# Patient Record
Sex: FEMALE | Race: WHITE | NOT HISPANIC OR LATINO | URBAN - METROPOLITAN AREA
[De-identification: names, ages, dates, MRNs, and addresses within clinical notes are randomized per-mention and may not be internally consistent; named-entity substitution may affect disease eponyms.]

---

## 2018-01-02 ENCOUNTER — INPATIENT (INPATIENT)
Facility: HOSPITAL | Age: 83
LOS: 6 days | Discharge: NURSING, REHAB OR LONG-TERM CARE | DRG: 871 | End: 2018-01-09
Attending: STUDENT IN AN ORGANIZED HEALTH CARE EDUCATION/TRAINING PROGRAM | Admitting: STUDENT IN AN ORGANIZED HEALTH CARE EDUCATION/TRAINING PROGRAM
Payer: MEDICARE

## 2018-01-02 VITALS
WEIGHT: 100.09 LBS | HEART RATE: 99 BPM | SYSTOLIC BLOOD PRESSURE: 92 MMHG | OXYGEN SATURATION: 84 % | DIASTOLIC BLOOD PRESSURE: 57 MMHG | TEMPERATURE: 98 F | RESPIRATION RATE: 22 BRPM

## 2018-01-02 DIAGNOSIS — E78.5 HYPERLIPIDEMIA, UNSPECIFIED: ICD-10-CM
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
## 2018-01-02 DIAGNOSIS — R41.82 ALTERED MENTAL STATUS, UNSPECIFIED: ICD-10-CM

## 2018-01-02 DIAGNOSIS — I10 ESSENTIAL (PRIMARY) HYPERTENSION: ICD-10-CM

## 2018-01-02 DIAGNOSIS — R74.8 ABNORMAL LEVELS OF OTHER SERUM ENZYMES: ICD-10-CM

## 2018-01-02 DIAGNOSIS — R06.02 SHORTNESS OF BREATH: ICD-10-CM

## 2018-01-02 DIAGNOSIS — R07.89 OTHER CHEST PAIN: ICD-10-CM

## 2018-01-02 DIAGNOSIS — I48.91 UNSPECIFIED ATRIAL FIBRILLATION: ICD-10-CM

## 2018-01-02 DIAGNOSIS — Z86.011 PERSONAL HISTORY OF BENIGN NEOPLASM OF THE BRAIN: Chronic | ICD-10-CM

## 2018-01-02 DIAGNOSIS — N17.9 ACUTE KIDNEY FAILURE, UNSPECIFIED: ICD-10-CM

## 2018-01-02 DIAGNOSIS — J10.1 INFLUENZA DUE TO OTHER IDENTIFIED INFLUENZA VIRUS WITH OTHER RESPIRATORY MANIFESTATIONS: ICD-10-CM

## 2018-01-02 DIAGNOSIS — A41.9 SEPSIS, UNSPECIFIED ORGANISM: ICD-10-CM

## 2018-01-02 LAB
APTT BLD: 34.1 SEC — SIGNIFICANT CHANGE UP (ref 27.5–37.4)
BASOPHILS # BLD AUTO: 0 K/UL — SIGNIFICANT CHANGE UP (ref 0–0.2)
BASOPHILS NFR BLD AUTO: 0 % — SIGNIFICANT CHANGE UP (ref 0–2)
CK MB CFR SERPL CALC: 3.4 NG/ML — SIGNIFICANT CHANGE UP (ref 0–3.8)
CK MB CFR SERPL CALC: 4.7 NG/ML — HIGH (ref 0–3.8)
CK SERPL-CCNC: 56 U/L — SIGNIFICANT CHANGE UP (ref 25–170)
CK SERPL-CCNC: 62 U/L — SIGNIFICANT CHANGE UP (ref 25–170)
EOSINOPHIL # BLD AUTO: 0 K/UL — SIGNIFICANT CHANGE UP (ref 0–0.5)
EOSINOPHIL NFR BLD AUTO: 0.2 % — SIGNIFICANT CHANGE UP (ref 0–6)
FLUAV H1 2009 PAND RNA SPEC QL NAA+PROBE: DETECTED
GAS PNL BLDV: SIGNIFICANT CHANGE UP
HCT VFR BLD CALC: 43.6 % — SIGNIFICANT CHANGE UP (ref 34.5–45)
HGB BLD-MCNC: 14.1 G/DL — SIGNIFICANT CHANGE UP (ref 11.5–15.5)
INR BLD: 0.94 RATIO — SIGNIFICANT CHANGE UP (ref 0.88–1.16)
LYMPHOCYTES # BLD AUTO: 1.6 K/UL — SIGNIFICANT CHANGE UP (ref 1–3.3)
LYMPHOCYTES # BLD AUTO: 15.3 % — SIGNIFICANT CHANGE UP (ref 13–44)
MCHC RBC-ENTMCNC: 32.4 GM/DL — SIGNIFICANT CHANGE UP (ref 32–36)
MCHC RBC-ENTMCNC: 32.5 PG — SIGNIFICANT CHANGE UP (ref 27–34)
MCV RBC AUTO: 100 FL — SIGNIFICANT CHANGE UP (ref 80–100)
MONOCYTES # BLD AUTO: 1.1 K/UL — HIGH (ref 0–0.9)
MONOCYTES NFR BLD AUTO: 10 % — SIGNIFICANT CHANGE UP (ref 2–14)
NEUTROPHILS # BLD AUTO: 7.9 K/UL — HIGH (ref 1.8–7.4)
NEUTROPHILS NFR BLD AUTO: 74.4 % — SIGNIFICANT CHANGE UP (ref 43–77)
PLATELET # BLD AUTO: 161 K/UL — SIGNIFICANT CHANGE UP (ref 150–400)
PROTHROM AB SERPL-ACNC: 10.2 SEC — SIGNIFICANT CHANGE UP (ref 9.8–12.7)
RAPID RVP RESULT: DETECTED
RBC # BLD: 4.35 M/UL — SIGNIFICANT CHANGE UP (ref 3.8–5.2)
RBC # FLD: 12.8 % — SIGNIFICANT CHANGE UP (ref 10.3–14.5)
TROPONIN T SERPL-MCNC: 0.46 NG/ML — HIGH (ref 0–0.06)
TROPONIN T SERPL-MCNC: 0.48 NG/ML — HIGH (ref 0–0.06)
WBC # BLD: 10.6 K/UL — HIGH (ref 3.8–10.5)
WBC # FLD AUTO: 10.6 K/UL — HIGH (ref 3.8–10.5)

## 2018-01-02 PROCEDURE — 99285 EMERGENCY DEPT VISIT HI MDM: CPT | Mod: 25,GC

## 2018-01-02 PROCEDURE — 99223 1ST HOSP IP/OBS HIGH 75: CPT

## 2018-01-02 PROCEDURE — 93010 ELECTROCARDIOGRAM REPORT: CPT

## 2018-01-02 PROCEDURE — 70450 CT HEAD/BRAIN W/O DYE: CPT | Mod: 26

## 2018-01-02 PROCEDURE — 73522 X-RAY EXAM HIPS BI 3-4 VIEWS: CPT | Mod: 26

## 2018-01-02 PROCEDURE — 73620 X-RAY EXAM OF FOOT: CPT | Mod: 26,LT

## 2018-01-02 PROCEDURE — 72125 CT NECK SPINE W/O DYE: CPT | Mod: 26

## 2018-01-02 PROCEDURE — 71045 X-RAY EXAM CHEST 1 VIEW: CPT | Mod: 26

## 2018-01-02 RX ORDER — ASPIRIN/CALCIUM CARB/MAGNESIUM 324 MG
324 TABLET ORAL ONCE
Qty: 0 | Refills: 0 | Status: COMPLETED | OUTPATIENT
Start: 2018-01-02 | End: 2018-01-02

## 2018-01-02 RX ORDER — SODIUM CHLORIDE 9 MG/ML
1000 INJECTION INTRAMUSCULAR; INTRAVENOUS; SUBCUTANEOUS ONCE
Qty: 0 | Refills: 0 | Status: COMPLETED | OUTPATIENT
Start: 2018-01-02 | End: 2018-01-02

## 2018-01-02 RX ORDER — ATORVASTATIN CALCIUM 80 MG/1
40 TABLET, FILM COATED ORAL AT BEDTIME
Qty: 0 | Refills: 0 | Status: DISCONTINUED | OUTPATIENT
Start: 2018-01-02 | End: 2018-01-09

## 2018-01-02 RX ORDER — IPRATROPIUM/ALBUTEROL SULFATE 18-103MCG
3 AEROSOL WITH ADAPTER (GRAM) INHALATION EVERY 6 HOURS
Qty: 0 | Refills: 0 | Status: DISCONTINUED | OUTPATIENT
Start: 2018-01-02 | End: 2018-01-09

## 2018-01-02 RX ORDER — PHENYLEPHRINE HYDROCHLORIDE 10 MG/ML
0.1 INJECTION INTRAVENOUS
Qty: 80 | Refills: 0 | Status: DISCONTINUED | OUTPATIENT
Start: 2018-01-02 | End: 2018-01-02

## 2018-01-02 RX ORDER — HEPARIN SODIUM 5000 [USP'U]/ML
5000 INJECTION INTRAVENOUS; SUBCUTANEOUS EVERY 8 HOURS
Qty: 0 | Refills: 0 | Status: DISCONTINUED | OUTPATIENT
Start: 2018-01-02 | End: 2018-01-04

## 2018-01-02 RX ORDER — PREGABALIN 225 MG/1
1000 CAPSULE ORAL DAILY
Qty: 0 | Refills: 0 | Status: DISCONTINUED | OUTPATIENT
Start: 2018-01-02 | End: 2018-01-09

## 2018-01-02 RX ORDER — ACETAMINOPHEN 500 MG
650 TABLET ORAL EVERY 6 HOURS
Qty: 0 | Refills: 0 | Status: DISCONTINUED | OUTPATIENT
Start: 2018-01-02 | End: 2018-01-09

## 2018-01-02 RX ORDER — LIDOCAINE 4 G/100G
1 CREAM TOPICAL DAILY
Qty: 0 | Refills: 0 | Status: DISCONTINUED | OUTPATIENT
Start: 2018-01-02 | End: 2018-01-09

## 2018-01-02 RX ORDER — ASCORBIC ACID 60 MG
500 TABLET,CHEWABLE ORAL DAILY
Qty: 0 | Refills: 0 | Status: DISCONTINUED | OUTPATIENT
Start: 2018-01-02 | End: 2018-01-09

## 2018-01-02 RX ADMIN — ATORVASTATIN CALCIUM 40 MILLIGRAM(S): 80 TABLET, FILM COATED ORAL at 21:57

## 2018-01-02 RX ADMIN — HEPARIN SODIUM 5000 UNIT(S): 5000 INJECTION INTRAVENOUS; SUBCUTANEOUS at 21:56

## 2018-01-02 RX ADMIN — SODIUM CHLORIDE 2000 MILLILITER(S): 9 INJECTION INTRAMUSCULAR; INTRAVENOUS; SUBCUTANEOUS at 11:46

## 2018-01-02 RX ADMIN — Medication 324 MILLIGRAM(S): at 14:59

## 2018-01-02 RX ADMIN — Medication 3 MILLILITER(S): at 21:56

## 2018-01-02 RX ADMIN — SODIUM CHLORIDE 500 MILLILITER(S): 9 INJECTION INTRAMUSCULAR; INTRAVENOUS; SUBCUTANEOUS at 17:20

## 2018-01-02 NOTE — H&P ADULT - PROBLEM SELECTOR PLAN 6
Likely medication induced hallucinations given that pt was incorrectly taking Robitusson DM at home. Hallucniations now resolevd and Daughter reports pt back to normal mental status. Likely medication induced hallucinations given that pt was incorrectly taking Robitussion DM at home. Hallucinations now resolved and Daughter reports pt back to normal mental status.

## 2018-01-02 NOTE — ED PROVIDER NOTE - OBJECTIVE STATEMENT
89yr old female PMH HTN, HLD, vertigo, left hip fracture s/p IM nailing (1997) presents to ED after fall.  Has had cold, been taking robitussin nighttime diabetes mellitus, been with visual hallucinations since friday, fell sunday and hurt ribs, today fell onto bottom.  Has had decreased appetite.  Has not been taking blood pressure meds until yesterday.  Diffusely weaker than usual.  Today fell onto bottom.  Sunday hit head.  No LOC per daughter, was able to crawl to chair.  C/o R posterior rib pain.  Has had productive cough that is now gone.      primary medical doctor: Gill Jenkins. 89yr old female PMH HTN, HLD, bppv, left hip fracture s/p IM nailing (1997) presents to ED after fall.  Since Friday has had productive cough, congestion, malaise, generalized weakness and decreased appetite and has been taking Robitussin nighttimeDM, and having visual hallucinations per daughter.  Fell sun day and hurt R posteroinferior ribs.  Denies head trauma.  This morning, fell again while putting dishes away in kitchen and landed on bottom.  Denies loc or head trauma and able to crawl to chair and sit.  Says both times lost balance while turning quickly, denies chest pain, n/v, diaphoresis, palpitations.  Has long term shortness of breath on exertion unchanged from previous.   Has not been taking blood pressure meds while sick, started again yesterday.  Denies fever, chills, n/v, focal weakness, abdominal pain, pelvic pain, headache, neck pain, vision change, h/o vte, cardiac history.      primary medical doctor: Gill Jenkins.

## 2018-01-02 NOTE — ED PROVIDER NOTE - MUSCULOSKELETAL, MLM
Spine appears normal, range of motion is not limited.  TTP R posteroinferior rib.  Tenderness, bruising and swelling of L 1st toe.

## 2018-01-02 NOTE — ED ADULT TRIAGE NOTE - CHIEF COMPLAINT QUOTE
Patient fell this morning, unwitnessed at home. Two falls over the weekend.   Recently sick, overdosed on Robitussin per daughter. Pt is hallucinating and unsure of time/date. at baseline pt is A&Ox4.   +dehydration, AMS

## 2018-01-02 NOTE — H&P ADULT - PROBLEM SELECTOR PLAN 4
Likely pre-renal vs intrinsic renal damage given viral induced sepsis. s/p 1LNS in ed. Will give additional Liter  - Check UA  - Trend BMP Likely pre-renal dehydration given hemoconcentration vs intrinsic renal damage and ATN given viral induced sepsis. s/p 1LNS in ed. Will give additional Liter  - Check UA  - Trend BMP, monitor UOP Cr. 1.3, baseline 0.6-0.7. Likely pre-renal dehydration given hemoconcentration vs intrinsic renal damage and ATN given viral induced sepsis. s/p 1LNS in ed. Will give additional Liter  - Check UA  - Trend BMP, monitor UOP Cr. 1.3, baseline 0.6-0.7. Likely pre-renal dehydration given hemoconcentration vs intrinsic renal damage and ATN given viral induced sepsis. s/p 1LNS in ed. Will give additional Liter  - Check UA, urine lytes  - Trend BMP, monitor UOP

## 2018-01-02 NOTE — H&P ADULT - PROBLEM SELECTOR PLAN 7
Hypotensive on admission with SBP in 80-90  - Hold Losartan, Lasix, norvasc and Atenolol Hypotensive on admission with SBP in 80-90  - Hold Losartan, Lasix, Norvasc and Atenolol

## 2018-01-02 NOTE — H&P ADULT - PROBLEM SELECTOR PLAN 10
Musculoskeletal in nature 2/2 fall.   Lidocaine patch to affected area, Tylenol as needed    11: Need for prophylactic measure  - HSQ for DVT ppx  - DASH diet  - PT consult when respiratory status improves

## 2018-01-02 NOTE — ED PROVIDER NOTE - ATTENDING CONTRIBUTION TO CARE
I have seen and evaluated this patient with the resident.   I agree with the findings  unless other wise stated.  I have made appropriate changes in documentations where needed, After my face to face bedside evaluation, I am further  noting: Recent cough/URI with visual hallucinations on robitussin, 2 falls, rib pain, toe pain, ekg showing new afib with rvr.  Will get ct head, c-spine, xray chest, xray pelvis, xray foot, cardiac atkinson.  O2sat 88% on 4L NC, now on face mask O2 sat normal.  No chest pain.  CHADsVASC 4.  Will hold on ac pending ct head.  On cardiac monitor. admit

## 2018-01-02 NOTE — H&P ADULT - ASSESSMENT
89 y/o female with h/o HTN, HLD, BPPV, left hip fracture s/p IM nailing (1997) who presented to ED after fall x2 and cough found to have CHERYL, and flu positive

## 2018-01-02 NOTE — H&P ADULT - NSHPLABSRESULTS_GEN_ALL_CORE
.  LABS:                         14.1   10.6  )-----------( 161      ( 02 Jan 2018 11:47 )             43.6     01-02    140  |  100  |  44<H>  ----------------------------<  147<H>  4.2   |  27  |  1.31<H>    Ca    10.6<H>      02 Jan 2018 11:45    TPro  7.0  /  Alb  3.4  /  TBili  0.7  /  DBili  x   /  AST  31  /  ALT  25  /  AlkPhos  80  01-02    PT/INR - ( 02 Jan 2018 11:47 )   PT: 10.2 sec;   INR: 0.94 ratio         PTT - ( 02 Jan 2018 11:47 )  PTT:34.1 sec    CARDIAC MARKERS ( 02 Jan 2018 11:45 )  x     / 0.48 ng/mL / 62 U/L / x     / 3.4 ng/mL      Serum Pro-Brain Natriuretic Peptide: 4910 pg/mL (01-02 @ 11:45)        RADIOLOGY, EKG & ADDITIONAL TESTS: EKG: A fib  CT head: Head CT: No evidence for intracranial hemorrhage, mass effect, or   displaced calvarial fracture.     Unchanged encephalomalacia and gliosis within the left frontal lobe.    No interval change from the prior CT examination.    Cervical spine CT: No evidence for acute displaced fracture or traumatic   malalignment. Cervical degenerative spondylosis    CXR: No focal infiltrate

## 2018-01-02 NOTE — H&P ADULT - HISTORY OF PRESENT ILLNESS
This is a 89 y/o female with h/o HTN, HLD, BPPV, left hip fracture s/p IM nailing (1997) who presented to ED after fall x2 and cough. Pt stated that on Thursday, she began to experience non-productive cough. No other associated symptoms. no myalgias, fever/chills, N/V/D, chest pain. Pt began talking Robitussin DM for symptomatic relief. Was taking every four hours. On Saturday, pt had a mechanical fall at home. Pt was in her kitchen when she turned around quickly and fell to the ground. No LOC, no head trauma. Pt had another fall again on Sunday when reaching into cabinets to put back dishes. Pt reported that she began to hallucinate stating that she was seeing "[place]" [place]. Had her Flu shot this September. Denies h/o Atrial fibrillation. Decreased PO intake, has not taken her medications since cough started.

## 2018-01-02 NOTE — H&P ADULT - PROBLEM SELECTOR PLAN 8
New onset Atrial fibrillation on presentation. now in NSR. CHADsVASC of 4.   Will check Echo to evaluate for valvular disease prior to initiation of AC New onset Atrial fibrillation on presentation. now in NSR. CHADsVASC of 4.   Will check Echo to evaluate for valvular disease prior to initiation of AC  - check TSH, A1c, lipid panel

## 2018-01-02 NOTE — H&P ADULT - PROBLEM SELECTOR PLAN 2
Pt presented with Tachycardia to 120's, hypotension and lactic acidosis to 2.7 with RVP positive for Influenza A meeting severe sepsis criteria  - Symptom management with Duonebs, supplemental O2 as needed, tylenol as needed

## 2018-01-02 NOTE — H&P ADULT - NSHPREVIEWOFSYSTEMS_GEN_ALL_CORE
CONSTITUTIONAL: No weakness, fevers or chills  EYES/ENT: No visual changes;  No vertigo or throat pain   NECK: No pain or stiffness  RESPIRATORY: + cough, + wheezing, no hemoptysis; No shortness of breath  CARDIOVASCULAR: +chest pain no palpitations  GASTROINTESTINAL: No abdominal or epigastric pain. No nausea, vomiting, or hematemesis; No diarrhea or constipation. No melena or hematochezia.  GENITOURINARY: No dysuria, frequency or hematuria  NEUROLOGICAL: No numbness or weakness  SKIN: No itching, burning, rashes, or lesions   All other review of systems is negative unless indicated above.

## 2018-01-02 NOTE — H&P ADULT - PROBLEM SELECTOR PLAN 5
0.48 on admission. New atrial fibrillation on presentation. Now in NSR. Troponin likely demand in setting new onset atrial fibrillation with RVR and hypotension. Chest pain free.   - Trend Troponins to peak  - Daily EKG 0.48 on admission. New atrial fibrillation on presentation. Now in NSR. Troponin likely demand in setting new onset atrial fibrillation with RVR and hypotension and decreased clearance given CHERYL. Chest pain free.   - Trend Troponins to peak  - Daily EKG

## 2018-01-02 NOTE — ED ADULT NURSE NOTE - PMH
BPPV (benign paroxysmal positional vertigo)    Hip fracture, left  s/p orthopedic intervention  Hyperlipidemia    Hypertension    Meningioma  s/p resection 2003

## 2018-01-02 NOTE — H&P ADULT - PROBLEM SELECTOR PLAN 3
Likely 2/2 viral induced Bronchospasm. Currently on NRB. Titrate down oxygen as tolerated.   - Duonebs Q6 Likely 2/2 viral induced Bronchospasm. Currently on NRB. Titrate down oxygen as tolerated.   - Duonebs Q6  - check Echo

## 2018-01-02 NOTE — ED PROVIDER NOTE - MEDICAL DECISION MAKING DETAILS
Recent cough/URI with visual hallucinations on robitussin, 2 falls, rib pain, toe pain, ekg showing new afib with rvr.  Will get ct head, c-spine, xray chest, xray pelvis, xray foot, cardiac atkinson.  O2sat 88% on 4L NC, now on face mask O2 sat normal.  No chest pain.  CHADsVASC 4.  Will hold on ac pending ct head.  On cardiac monitor.

## 2018-01-02 NOTE — H&P ADULT - PROBLEM SELECTOR PLAN 1
Pt found to be flu positive on RVP, likely the etiology of her cough. no other symptoms, no myalgia, fever, chills, N/V. Only reports decreased appetite.   - WIll hold off on Tamiflu as presentation >48 after initial symptoms.  - symptomatic management  - Duonebs, tessalon pearles

## 2018-01-02 NOTE — ED ADULT NURSE NOTE - OBJECTIVE STATEMENT
89 yo  female A&OX3 presents to the ED with the c/o AMS, pt brought into the ED by daughter. As per daughter pt has been lethargic, altered, has had 2 falls in the last three days. Pt admits to taking too much Robitussin DM, daughter states that she started taking med on Thursday and bottle is almost done. + Fall on sat and thins morning, pt states that on Saturday she hit her head, denies loc. Today pt states that she fell on her bottom, c/o r sided rib pain. + non productive cough, no fevers or chills. States that she feels her heart racing, HR in ED is 123, pt in A fib, denies hx of A fib. No noted brushing or swelling.

## 2018-01-02 NOTE — H&P ADULT - NSHPPHYSICALEXAM_GEN_ALL_CORE
.  VITAL SIGNS:  T(C): 37.2 (01-02-18 @ 13:16), Max: 37.2 (01-02-18 @ 13:16)  T(F): 99 (01-02-18 @ 13:16), Max: 99 (01-02-18 @ 13:16)  HR: 99 (01-02-18 @ 10:55) (99 - 99)  BP: 92/57 (01-02-18 @ 10:55) (92/57 - 92/57)  BP(mean): --  RR: 22 (01-02-18 @ 10:55) (22 - 22)  SpO2: 84% (01-02-18 @ 10:55) (84% - 84%)  Wt(kg): --    PHYSICAL EXAM:    Constitutional: WDWN resting comfortably in bed; NAD  Head: NC/AT  Eyes: PERRL, EOMI, anicteric sclera  ENT: no nasal discharge; uvula midline, no oropharyngeal erythema or exudates; MMM  Neck: supple; no JVD or thyromegaly  Respiratory: Decreased breath sounds bilaterally. + Pain on palpation over right anterior chest  Cardiac: +S1/S2; RRR; no M/R/G; PMI non-displaced  Gastrointestinal: soft, NT/ND; no rebound or guarding; +BSx4  Back: spine midline, no bony tenderness or step-offs; no CVAT B/L  Extremities: WWP, no clubbing or cyanosis; no peripheral edema  Musculoskeletal: NROM x4; no joint swelling, tenderness or erythema  Vascular: 2+ radial, femoral, DP/PT pulses B/L  Neurologic: AAOx3; CNII-XII grossly intact; no focal deficits  Psychiatric: affect and characteristics of appearance, verbalizations, behaviors are appropriate

## 2018-01-03 DIAGNOSIS — I48.0 PAROXYSMAL ATRIAL FIBRILLATION: ICD-10-CM

## 2018-01-03 LAB
ANION GAP SERPL CALC-SCNC: 13 MMOL/L — SIGNIFICANT CHANGE UP (ref 5–17)
BUN SERPL-MCNC: 35 MG/DL — HIGH (ref 7–23)
CALCIUM SERPL-MCNC: 10.4 MG/DL — SIGNIFICANT CHANGE UP (ref 8.4–10.5)
CHLORIDE SERPL-SCNC: 109 MMOL/L — HIGH (ref 96–108)
CHOLEST SERPL-MCNC: 114 MG/DL — SIGNIFICANT CHANGE UP (ref 10–199)
CO2 SERPL-SCNC: 24 MMOL/L — SIGNIFICANT CHANGE UP (ref 22–31)
CREAT SERPL-MCNC: 0.79 MG/DL — SIGNIFICANT CHANGE UP (ref 0.5–1.3)
GLUCOSE SERPL-MCNC: 103 MG/DL — HIGH (ref 70–99)
HBA1C BLD-MCNC: 6 % — HIGH (ref 4–5.6)
HCT VFR BLD CALC: 40.7 % — SIGNIFICANT CHANGE UP (ref 34.5–45)
HDLC SERPL-MCNC: 47 MG/DL — SIGNIFICANT CHANGE UP (ref 40–125)
HGB BLD-MCNC: 12.3 G/DL — SIGNIFICANT CHANGE UP (ref 11.5–15.5)
LIPID PNL WITH DIRECT LDL SERPL: 45 MG/DL — SIGNIFICANT CHANGE UP
MAGNESIUM SERPL-MCNC: 1.9 MG/DL — SIGNIFICANT CHANGE UP (ref 1.6–2.6)
MCHC RBC-ENTMCNC: 30.2 GM/DL — LOW (ref 32–36)
MCHC RBC-ENTMCNC: 30.5 PG — SIGNIFICANT CHANGE UP (ref 27–34)
MCV RBC AUTO: 101 FL — HIGH (ref 80–100)
PLATELET # BLD AUTO: 125 K/UL — LOW (ref 150–400)
POTASSIUM SERPL-MCNC: 4.5 MMOL/L — SIGNIFICANT CHANGE UP (ref 3.5–5.3)
POTASSIUM SERPL-SCNC: 4.5 MMOL/L — SIGNIFICANT CHANGE UP (ref 3.5–5.3)
RBC # BLD: 4.03 M/UL — SIGNIFICANT CHANGE UP (ref 3.8–5.2)
RBC # FLD: 13.8 % — SIGNIFICANT CHANGE UP (ref 10.3–14.5)
SODIUM SERPL-SCNC: 146 MMOL/L — HIGH (ref 135–145)
TOTAL CHOLESTEROL/HDL RATIO MEASUREMENT: 2.4 RATIO — LOW (ref 3.3–7.1)
TRIGL SERPL-MCNC: 109 MG/DL — SIGNIFICANT CHANGE UP (ref 10–149)
TSH SERPL-MCNC: 0.66 UIU/ML — SIGNIFICANT CHANGE UP (ref 0.27–4.2)
WBC # BLD: 7.29 K/UL — SIGNIFICANT CHANGE UP (ref 3.8–10.5)
WBC # FLD AUTO: 7.29 K/UL — SIGNIFICANT CHANGE UP (ref 3.8–10.5)

## 2018-01-03 RX ORDER — MULTIVIT-MIN/FERROUS GLUCONATE 9 MG/15 ML
0 LIQUID (ML) ORAL
Qty: 0 | Refills: 0 | COMMUNITY

## 2018-01-03 RX ADMIN — Medication 600 MILLIGRAM(S): at 19:18

## 2018-01-03 RX ADMIN — Medication 3 MILLILITER(S): at 08:25

## 2018-01-03 RX ADMIN — HEPARIN SODIUM 5000 UNIT(S): 5000 INJECTION INTRAVENOUS; SUBCUTANEOUS at 14:17

## 2018-01-03 RX ADMIN — HEPARIN SODIUM 5000 UNIT(S): 5000 INJECTION INTRAVENOUS; SUBCUTANEOUS at 21:58

## 2018-01-03 RX ADMIN — HEPARIN SODIUM 5000 UNIT(S): 5000 INJECTION INTRAVENOUS; SUBCUTANEOUS at 06:14

## 2018-01-03 RX ADMIN — LIDOCAINE 1 PATCH: 4 CREAM TOPICAL at 14:23

## 2018-01-03 RX ADMIN — Medication 3 MILLILITER(S): at 21:59

## 2018-01-03 RX ADMIN — Medication 3 MILLILITER(S): at 02:36

## 2018-01-03 RX ADMIN — ATORVASTATIN CALCIUM 40 MILLIGRAM(S): 80 TABLET, FILM COATED ORAL at 21:58

## 2018-01-03 RX ADMIN — Medication 3 MILLILITER(S): at 14:17

## 2018-01-03 NOTE — CONSULT NOTE ADULT - SUBJECTIVE AND OBJECTIVE BOX
[facility] Cardiology Consult  _________________________    CC: cough, irregular heart beat.    HPI:  89 F h/o HTN, HLD, BPPV, left hip fracture s/p IMN (1997) a/w falls x 2 and a cough. On thurs she developed a non-productive cough. No muscle aches, fever/chills, dyspnea or pleuritic chest pain. On Saturday she  had a mechanical fall -was in the kitchen when she turned around quickly and fell to the ground. No LOC, no head trauma. She had another fall again on Sunday when reaching into cabinets to put back dishes.     PAST MEDICAL & SURGICAL HISTORY:  BPPV (benign paroxysmal positional vertigo)  Hip fracture, left: s/p orthopedic intervention  Hyperlipidemia  Hypertension  Meningioma: s/p resection 2003  H/O meningioma of the brain      MEDICATIONS  (STANDING):  ALBUTerol/ipratropium for Nebulization 3 milliLiter(s) Nebulizer every 6 hours  ascorbic acid 500 milliGRAM(s) Oral daily  atorvastatin 40 milliGRAM(s) Oral at bedtime  cyanocobalamin 1000 MICROGram(s) Oral daily  heparin  Injectable 5000 Unit(s) SubCutaneous every 8 hours  lidocaine   Patch 1 Patch Transdermal daily    MEDICATIONS  (PRN):  acetaminophen   Tablet. 650 milliGRAM(s) Oral every 6 hours PRN Moderate Pain (4 - 6)  benzonatate 100 milliGRAM(s) Oral every 8 hours PRN Cough      Allergies    No Known Allergies    Intolerances        Social Histroy: Tobacco- , ETOH-, Illicit Drugs-    T(C): 37.2 (01-02-18 @ 19:58), Max: 37.2 (01-02-18 @ 13:16)  HR: 70 (01-02-18 @ 19:58) (70 - 99)  BP: 162/60 (01-02-18 @ 19:58) (92/57 - 162/60)  RR: 18 (01-02-18 @ 19:58) (18 - 22)  SpO2: 95% (01-02-18 @ 19:58) (84% - 96%)  I&O's Summary      Review of Systems:  Constitutional: [ ] Fever [ ] Chills [ ] Fatigue [ ] Weight change   HEENT: [ ] Blurred vision [ ] Eye Pain [ ] Headache [ ] Runny nose [ ] Sore Throat   Respiratory: [x ] Cough [ ] Wheezing [ ] Shortness of breath  Cardiovascular: [ ] Chest Pain [ ] Palpitations [ ] DOMÍNGUEZ [ ] PND [ ] Orthopnea  Gastrointestinal: [ ] Abdominal Pain [ ] Diarrhea [ ] Constipation [ ] Hemorrhoids [ ] Nausea [ ] Vomiting  Genitourinary: [ ] Nocturia [ ] Dysuria [ ] Incontinence  Extremities: [ ] Swelling [ ] Joint Pain  Neurologic: [ ] Focal deficit [ ] Paresthesias [ ] Syncope  Lymphatic: [ ] Swelling [ ] Lymphadenopathy   Skin: [ ] Rash [ ] Ecchymoses [ ] Wounds [ ] Lesions  Psychiatry: [ ] Depression [ ] Suicidal/Homicidal Ideation [ ] Anxiety [ ] Sleep Disturbances  [x ] 10 point review of systems is otherwise negative except as mentioned above            [ ]Unable to obtain    PHYSICAL EXAM:  GENERAL: Alert, NAD  NECK: Supple, No JVD, No carotid bruit.  CHEST/LUNG: Clear to auscultation bilaterally; No wheezes, rales, or rhonchi  HEART: S1 S2 normal, irreg. irreg rhythm.,  No murmurs, rubs, or gallops  ABDOMEN: Soft, Nontender, Nondistended; Bowel sounds present  EXTREMITIES:  No LE edema.      LABS:                        12.3   7.29  )-----------( 125      ( 03 Jan 2018 07:21 )             40.7     01-03    146<H>  |  109<H>  |  35<H>  ----------------------------<  103<H>  4.5   |  24  |  0.79    Ca    10.4      03 Jan 2018 07:12  Mg     1.9     01-03    TPro  7.0  /  Alb  3.4  /  TBili  0.7  /  DBili  x   /  AST  31  /  ALT  25  /  AlkPhos  80  01-02    PT/INR - ( 02 Jan 2018 11:47 )   PT: 10.2 sec;   INR: 0.94 ratio         PTT - ( 02 Jan 2018 11:47 )  PTT:34.1 sec  CARDIAC MARKERS ( 02 Jan 2018 19:44 )  x     / 0.46 ng/mL / 56 U/L / x     / 4.7 ng/mL  CARDIAC MARKERS ( 02 Jan 2018 11:45 )  x     / 0.48 ng/mL / 62 U/L / x     / 3.4 ng/mL      Serum Pro-Brain Natriuretic Peptide: 4910 pg/mL (01-02-18 @ 11:45)      MEDICATIONS  (STANDING):  ALBUTerol/ipratropium for Nebulization 3 milliLiter(s) Nebulizer every 6 hours  ascorbic acid 500 milliGRAM(s) Oral daily  atorvastatin 40 milliGRAM(s) Oral at bedtime  cyanocobalamin 1000 MICROGram(s) Oral daily  heparin  Injectable 5000 Unit(s) SubCutaneous every 8 hours  lidocaine   Patch 1 Patch Transdermal daily    MEDICATIONS  (PRN):  acetaminophen   Tablet. 650 milliGRAM(s) Oral every 6 hours PRN Moderate Pain (4 - 6)  benzonatate 100 milliGRAM(s) Oral every 8 hours PRN Cough      RADIOLOGY & ADDITIONAL TESTS:    Cardiology testing:  EKG: AF, PRWP, left axis. [facility] Cardiology Consult  _________________________    CC: cough, irregular heart beat.    HPI:  89 F h/o HTN, HLD, BPPV, left hip fracture s/p IMN (1997) a/w falls x 2 and a cough. On thurs she developed a non-productive cough. No muscle aches, fever/chills, dyspnea or pleuritic chest pain. On Saturday she  had a mechanical fall -was in the kitchen when she turned around quickly and fell to the ground. No LOC, no head trauma. She had another fall again on Sunday when reaching into cabinets to put back dishes.     PAST MEDICAL & SURGICAL HISTORY:  BPPV (benign paroxysmal positional vertigo)  Hip fracture, left: s/p orthopedic intervention  Hyperlipidemia  Hypertension  Meningioma: s/p resection 2003  H/O meningioma of the brain      MEDICATIONS  (STANDING):  ALBUTerol/ipratropium for Nebulization 3 milliLiter(s) Nebulizer every 6 hours  ascorbic acid 500 milliGRAM(s) Oral daily  atorvastatin 40 milliGRAM(s) Oral at bedtime  cyanocobalamin 1000 MICROGram(s) Oral daily  heparin  Injectable 5000 Unit(s) SubCutaneous every 8 hours  lidocaine   Patch 1 Patch Transdermal daily    MEDICATIONS  (PRN):  acetaminophen   Tablet. 650 milliGRAM(s) Oral every 6 hours PRN Moderate Pain (4 - 6)  benzonatate 100 milliGRAM(s) Oral every 8 hours PRN Cough      Allergies    No Known Allergies    Intolerances        Social Histroy: Tobacco- , ETOH-, Illicit Drugs-    T(C): 37.2 (01-02-18 @ 19:58), Max: 37.2 (01-02-18 @ 13:16)  HR: 70 (01-02-18 @ 19:58) (70 - 99)  BP: 162/60 (01-02-18 @ 19:58) (92/57 - 162/60)  RR: 18 (01-02-18 @ 19:58) (18 - 22)  SpO2: 95% (01-02-18 @ 19:58) (84% - 96%)  I&O's Summary      Review of Systems:  Constitutional: [ ] Fever [ ] Chills [ ] Fatigue [ ] Weight change   HEENT: [ ] Blurred vision [ ] Eye Pain [ ] Headache [ ] Runny nose [ ] Sore Throat   Respiratory: [x ] Cough [ ] Wheezing [ ] Shortness of breath  Cardiovascular: [ ] Chest Pain [ ] Palpitations [ ] DOMÍNGUEZ [ ] PND [ ] Orthopnea  Gastrointestinal: [ ] Abdominal Pain [ ] Diarrhea [ ] Constipation [ ] Hemorrhoids [ ] Nausea [ ] Vomiting  Genitourinary: [ ] Nocturia [ ] Dysuria [ ] Incontinence  Extremities: [ ] Swelling [ ] Joint Pain  Neurologic: [ ] Focal deficit [ ] Paresthesias [ ] Syncope  Lymphatic: [ ] Swelling [ ] Lymphadenopathy   Skin: [ ] Rash [ ] Ecchymoses [ ] Wounds [ ] Lesions  Psychiatry: [ ] Depression [ ] Suicidal/Homicidal Ideation [ ] Anxiety [ ] Sleep Disturbances  [x ] 10 point review of systems is otherwise negative except as mentioned above            [ ]Unable to obtain    PHYSICAL EXAM:  GENERAL: Elderly F, alert.  NECK: Supple, No JVD, No carotid bruit.  CHEST/LUNG: scattered wheeze/rhonchi.  HEART: S1 S2 normal, irreg. irreg rhythm.,  No murmurs, rubs, or gallops  ABDOMEN: Soft, Nontender, Nondistended; Bowel sounds present  EXTREMITIES:  No LE edema.      LABS:                        12.3   7.29  )-----------( 125      ( 03 Jan 2018 07:21 )             40.7     01-03    146<H>  |  109<H>  |  35<H>  ----------------------------<  103<H>  4.5   |  24  |  0.79    Ca    10.4      03 Jan 2018 07:12  Mg     1.9     01-03    TPro  7.0  /  Alb  3.4  /  TBili  0.7  /  DBili  x   /  AST  31  /  ALT  25  /  AlkPhos  80  01-02    PT/INR - ( 02 Jan 2018 11:47 )   PT: 10.2 sec;   INR: 0.94 ratio         PTT - ( 02 Jan 2018 11:47 )  PTT:34.1 sec  CARDIAC MARKERS ( 02 Jan 2018 19:44 )  x     / 0.46 ng/mL / 56 U/L / x     / 4.7 ng/mL  CARDIAC MARKERS ( 02 Jan 2018 11:45 )  x     / 0.48 ng/mL / 62 U/L / x     / 3.4 ng/mL      Serum Pro-Brain Natriuretic Peptide: 4910 pg/mL (01-02-18 @ 11:45)      MEDICATIONS  (STANDING):  ALBUTerol/ipratropium for Nebulization 3 milliLiter(s) Nebulizer every 6 hours  ascorbic acid 500 milliGRAM(s) Oral daily  atorvastatin 40 milliGRAM(s) Oral at bedtime  cyanocobalamin 1000 MICROGram(s) Oral daily  heparin  Injectable 5000 Unit(s) SubCutaneous every 8 hours  lidocaine   Patch 1 Patch Transdermal daily    MEDICATIONS  (PRN):  acetaminophen   Tablet. 650 milliGRAM(s) Oral every 6 hours PRN Moderate Pain (4 - 6)  benzonatate 100 milliGRAM(s) Oral every 8 hours PRN Cough      RADIOLOGY & ADDITIONAL TESTS:    Cardiology testing:  EKG: AF, PRWP, left axis.

## 2018-01-03 NOTE — CONSULT NOTE ADULT - PROBLEM SELECTOR RECOMMENDATION 9
-  - rates currently controlled.  - telemetry monitoring.   - mild troponin elevation in the setting of viral illness.  - check echo.  - will hold off on AC for now. -  - while I was examining her in the ED she went from sinus 70's to 's.  - telemetry monitoring.   - cardizem 30 mg po q6h.  - mild troponin elevation in the setting of viral illness.  - check echo.  - will hold off on AC for now. If AF persists beyond 24 hours will consider AC for stroke prevention.

## 2018-01-03 NOTE — CONSULT NOTE ADULT - ASSESSMENT
89 F h/o HTN, HLD, BPPV, left hip fracture s/p IMN (1997) a/w falls x 2, influenza A an new onset Afib.

## 2018-01-03 NOTE — PATIENT PROFILE ADULT. - FUNCTIONAL SCREEN CURRENT LEVEL: DRESSING, MLM
Procedure   Small Joint Arthrocentesis  Consent given by: patient  Site marked: site marked  Timeout: Immediately prior to procedure a time out was called to verify the correct patient, procedure, equipment, support staff and site/side marked as required   Supporting Documentation  Indications: pain   Procedure Details  Location: great toe - R great MTP  Preparation: Patient was prepped and draped in the usual sterile fashion  Needle size: 25 G  Approach: medial  Medications administered: 0.5 mL lidocaine 1 %; 1 mL lidocaine 1 %; 20 mg triamcinolone acetonide 40 MG/ML  Patient tolerance: patient tolerated the procedure well with no immediate complications            (0) independent

## 2018-01-03 NOTE — CONSULT NOTE ADULT - PROBLEM SELECTOR RECOMMENDATION 2
- supportive treatment per primary team.    will follow.    Liam Bae M.D., [facility]  [phone]     A total of 80 minutes of face-to-face time was spent with the patient during this encounter -over half of that time was spent in counseling and coordination of care.

## 2018-01-04 LAB
ANION GAP SERPL CALC-SCNC: 10 MMOL/L — SIGNIFICANT CHANGE UP (ref 5–17)
APTT BLD: 43.7 SEC — HIGH (ref 27.5–37.4)
BUN SERPL-MCNC: 35 MG/DL — HIGH (ref 7–23)
CALCIUM SERPL-MCNC: 10.3 MG/DL — SIGNIFICANT CHANGE UP (ref 8.4–10.5)
CHLORIDE SERPL-SCNC: 109 MMOL/L — HIGH (ref 96–108)
CO2 SERPL-SCNC: 27 MMOL/L — SIGNIFICANT CHANGE UP (ref 22–31)
CREAT SERPL-MCNC: 0.79 MG/DL — SIGNIFICANT CHANGE UP (ref 0.5–1.3)
GLUCOSE SERPL-MCNC: 152 MG/DL — HIGH (ref 70–99)
HCT VFR BLD CALC: 38.5 % — SIGNIFICANT CHANGE UP (ref 34.5–45)
HGB BLD-MCNC: 11.7 G/DL — SIGNIFICANT CHANGE UP (ref 11.5–15.5)
INR BLD: 1.02 RATIO — SIGNIFICANT CHANGE UP (ref 0.88–1.16)
MAGNESIUM SERPL-MCNC: 2 MG/DL — SIGNIFICANT CHANGE UP (ref 1.6–2.6)
MCHC RBC-ENTMCNC: 30.3 PG — SIGNIFICANT CHANGE UP (ref 27–34)
MCHC RBC-ENTMCNC: 30.4 GM/DL — LOW (ref 32–36)
MCV RBC AUTO: 99.7 FL — SIGNIFICANT CHANGE UP (ref 80–100)
PHOSPHATE SERPL-MCNC: 2.1 MG/DL — LOW (ref 2.5–4.5)
PLATELET # BLD AUTO: 131 K/UL — LOW (ref 150–400)
POTASSIUM SERPL-MCNC: 4.6 MMOL/L — SIGNIFICANT CHANGE UP (ref 3.5–5.3)
POTASSIUM SERPL-SCNC: 4.6 MMOL/L — SIGNIFICANT CHANGE UP (ref 3.5–5.3)
PROTHROM AB SERPL-ACNC: 11.5 SEC — SIGNIFICANT CHANGE UP (ref 10–13.1)
RBC # BLD: 3.86 M/UL — SIGNIFICANT CHANGE UP (ref 3.8–5.2)
RBC # FLD: 13.7 % — SIGNIFICANT CHANGE UP (ref 10.3–14.5)
SODIUM SERPL-SCNC: 146 MMOL/L — HIGH (ref 135–145)
WBC # BLD: 6.55 K/UL — SIGNIFICANT CHANGE UP (ref 3.8–10.5)
WBC # FLD AUTO: 6.55 K/UL — SIGNIFICANT CHANGE UP (ref 3.8–10.5)

## 2018-01-04 PROCEDURE — 93306 TTE W/DOPPLER COMPLETE: CPT | Mod: 26

## 2018-01-04 PROCEDURE — 93010 ELECTROCARDIOGRAM REPORT: CPT

## 2018-01-04 RX ORDER — ASPIRIN/CALCIUM CARB/MAGNESIUM 324 MG
325 TABLET ORAL ONCE
Qty: 0 | Refills: 0 | Status: COMPLETED | OUTPATIENT
Start: 2018-01-04 | End: 2018-01-04

## 2018-01-04 RX ORDER — APIXABAN 2.5 MG/1
2.5 TABLET, FILM COATED ORAL EVERY 12 HOURS
Qty: 0 | Refills: 0 | Status: DISCONTINUED | OUTPATIENT
Start: 2018-01-04 | End: 2018-01-09

## 2018-01-04 RX ADMIN — Medication 3 MILLILITER(S): at 13:19

## 2018-01-04 RX ADMIN — LIDOCAINE 1 PATCH: 4 CREAM TOPICAL at 13:19

## 2018-01-04 RX ADMIN — Medication 3 MILLILITER(S): at 09:18

## 2018-01-04 RX ADMIN — HEPARIN SODIUM 5000 UNIT(S): 5000 INJECTION INTRAVENOUS; SUBCUTANEOUS at 05:46

## 2018-01-04 RX ADMIN — Medication 600 MILLIGRAM(S): at 05:45

## 2018-01-04 RX ADMIN — ATORVASTATIN CALCIUM 40 MILLIGRAM(S): 80 TABLET, FILM COATED ORAL at 23:46

## 2018-01-04 RX ADMIN — Medication 3 MILLILITER(S): at 03:15

## 2018-01-04 RX ADMIN — LIDOCAINE 1 PATCH: 4 CREAM TOPICAL at 03:30

## 2018-01-04 RX ADMIN — Medication 325 MILLIGRAM(S): at 06:52

## 2018-01-04 RX ADMIN — Medication 3 MILLILITER(S): at 22:05

## 2018-01-04 RX ADMIN — APIXABAN 2.5 MILLIGRAM(S): 2.5 TABLET, FILM COATED ORAL at 18:10

## 2018-01-04 RX ADMIN — PREGABALIN 1000 MICROGRAM(S): 225 CAPSULE ORAL at 13:19

## 2018-01-04 RX ADMIN — Medication 500 MILLIGRAM(S): at 13:19

## 2018-01-04 RX ADMIN — Medication 600 MILLIGRAM(S): at 18:10

## 2018-01-04 NOTE — CHART NOTE - NSCHARTNOTEFT_GEN_A_CORE
Medicine NP episodic Note    Notified by RN, pt. converted to Afib on tele.  Pt. asymptomatic, denies c/o CP, SOB, palpitations, dizziness, lightheadedness, HA, or any other associated symptoms.  Pt. is a 89 y/o F with h/o HTN, HLD, BPPV, left hip fracture s/p IM nailing (1997) who presented to ED after fall x2 and cough found to have CHERYL, and flu positive.  Pt. with new onset Afib on presentation in ED but converted to NSR without medical intervention.         Vital Signs Last 24 Hrs  T(C): 36.9 (04 Jan 2018 05:43), Max: 36.9 (03 Jan 2018 08:19)  T(F): 98.4 (04 Jan 2018 05:43), Max: 98.5 (03 Jan 2018 08:19)  HR: 111 (04 Jan 2018 05:43) (65 - 118)  BP: 121/61 (04 Jan 2018 05:43) (111/66 - 173/67)  BP(mean): --  RR: 16 (04 Jan 2018 05:43) (16 - 19)  SpO2: 95% (04 Jan 2018 05:43) (92% - 98%)    # Afib  > Stat EKG  > Stat  mg PO x 1 per Dr. Koo   > Continue Heparin SQ for DVT prophylaxis for now   > Attending/Cardiology to decide on AC therapy in am   > Continue tele monitoring  > Monitor vital signs     Will endorse to primary team in am.    Criselda Urbina, [facility]  [phone]

## 2018-01-05 LAB
ANION GAP SERPL CALC-SCNC: 10 MMOL/L — SIGNIFICANT CHANGE UP (ref 5–17)
BUN SERPL-MCNC: 33 MG/DL — HIGH (ref 7–23)
CALCIUM SERPL-MCNC: 9.9 MG/DL — SIGNIFICANT CHANGE UP (ref 8.4–10.5)
CHLORIDE SERPL-SCNC: 108 MMOL/L — SIGNIFICANT CHANGE UP (ref 96–108)
CO2 SERPL-SCNC: 26 MMOL/L — SIGNIFICANT CHANGE UP (ref 22–31)
CREAT SERPL-MCNC: 0.71 MG/DL — SIGNIFICANT CHANGE UP (ref 0.5–1.3)
GLUCOSE SERPL-MCNC: 105 MG/DL — HIGH (ref 70–99)
HCT VFR BLD CALC: 34.5 % — SIGNIFICANT CHANGE UP (ref 34.5–45)
HGB BLD-MCNC: 10.4 G/DL — LOW (ref 11.5–15.5)
MCHC RBC-ENTMCNC: 30.1 GM/DL — LOW (ref 32–36)
MCHC RBC-ENTMCNC: 30.1 PG — SIGNIFICANT CHANGE UP (ref 27–34)
MCV RBC AUTO: 99.7 FL — SIGNIFICANT CHANGE UP (ref 80–100)
PLATELET # BLD AUTO: 136 K/UL — LOW (ref 150–400)
POTASSIUM SERPL-MCNC: 4.5 MMOL/L — SIGNIFICANT CHANGE UP (ref 3.5–5.3)
POTASSIUM SERPL-SCNC: 4.5 MMOL/L — SIGNIFICANT CHANGE UP (ref 3.5–5.3)
RBC # BLD: 3.46 M/UL — LOW (ref 3.8–5.2)
RBC # FLD: 13.7 % — SIGNIFICANT CHANGE UP (ref 10.3–14.5)
SODIUM SERPL-SCNC: 144 MMOL/L — SIGNIFICANT CHANGE UP (ref 135–145)
WBC # BLD: 6.96 K/UL — SIGNIFICANT CHANGE UP (ref 3.8–10.5)
WBC # FLD AUTO: 6.96 K/UL — SIGNIFICANT CHANGE UP (ref 3.8–10.5)

## 2018-01-05 RX ORDER — APIXABAN 2.5 MG/1
1 TABLET, FILM COATED ORAL
Qty: 60 | Refills: 0 | OUTPATIENT
Start: 2018-01-05 | End: 2018-02-03

## 2018-01-05 RX ADMIN — Medication 600 MILLIGRAM(S): at 18:01

## 2018-01-05 RX ADMIN — PREGABALIN 1000 MICROGRAM(S): 225 CAPSULE ORAL at 11:24

## 2018-01-05 RX ADMIN — LIDOCAINE 1 PATCH: 4 CREAM TOPICAL at 02:00

## 2018-01-05 RX ADMIN — APIXABAN 2.5 MILLIGRAM(S): 2.5 TABLET, FILM COATED ORAL at 18:01

## 2018-01-05 RX ADMIN — Medication 3 MILLILITER(S): at 23:08

## 2018-01-05 RX ADMIN — Medication 600 MILLIGRAM(S): at 05:04

## 2018-01-05 RX ADMIN — APIXABAN 2.5 MILLIGRAM(S): 2.5 TABLET, FILM COATED ORAL at 05:05

## 2018-01-05 RX ADMIN — ATORVASTATIN CALCIUM 40 MILLIGRAM(S): 80 TABLET, FILM COATED ORAL at 23:09

## 2018-01-05 RX ADMIN — LIDOCAINE 1 PATCH: 4 CREAM TOPICAL at 11:24

## 2018-01-05 RX ADMIN — Medication 500 MILLIGRAM(S): at 11:24

## 2018-01-05 RX ADMIN — Medication 3 MILLILITER(S): at 18:01

## 2018-01-05 RX ADMIN — Medication 3 MILLILITER(S): at 11:24

## 2018-01-05 RX ADMIN — Medication 3 MILLILITER(S): at 05:04

## 2018-01-06 LAB
ANION GAP SERPL CALC-SCNC: 9 MMOL/L — SIGNIFICANT CHANGE UP (ref 5–17)
BUN SERPL-MCNC: 31 MG/DL — HIGH (ref 7–23)
CALCIUM SERPL-MCNC: 10.2 MG/DL — SIGNIFICANT CHANGE UP (ref 8.4–10.5)
CHLORIDE SERPL-SCNC: 108 MMOL/L — SIGNIFICANT CHANGE UP (ref 96–108)
CO2 SERPL-SCNC: 25 MMOL/L — SIGNIFICANT CHANGE UP (ref 22–31)
CREAT SERPL-MCNC: 0.72 MG/DL — SIGNIFICANT CHANGE UP (ref 0.5–1.3)
GLUCOSE SERPL-MCNC: 137 MG/DL — HIGH (ref 70–99)
HCT VFR BLD CALC: 35.5 % — SIGNIFICANT CHANGE UP (ref 34.5–45)
HGB BLD-MCNC: 11 G/DL — LOW (ref 11.5–15.5)
MCHC RBC-ENTMCNC: 30.8 PG — SIGNIFICANT CHANGE UP (ref 27–34)
MCHC RBC-ENTMCNC: 31 GM/DL — LOW (ref 32–36)
MCV RBC AUTO: 99.4 FL — SIGNIFICANT CHANGE UP (ref 80–100)
PLATELET # BLD AUTO: 167 K/UL — SIGNIFICANT CHANGE UP (ref 150–400)
POTASSIUM SERPL-MCNC: 4.4 MMOL/L — SIGNIFICANT CHANGE UP (ref 3.5–5.3)
POTASSIUM SERPL-SCNC: 4.4 MMOL/L — SIGNIFICANT CHANGE UP (ref 3.5–5.3)
RBC # BLD: 3.57 M/UL — LOW (ref 3.8–5.2)
RBC # FLD: 13.9 % — SIGNIFICANT CHANGE UP (ref 10.3–14.5)
SODIUM SERPL-SCNC: 142 MMOL/L — SIGNIFICANT CHANGE UP (ref 135–145)
WBC # BLD: 6.82 K/UL — SIGNIFICANT CHANGE UP (ref 3.8–10.5)
WBC # FLD AUTO: 6.82 K/UL — SIGNIFICANT CHANGE UP (ref 3.8–10.5)

## 2018-01-06 RX ADMIN — Medication 3 MILLILITER(S): at 11:18

## 2018-01-06 RX ADMIN — Medication 500 MILLIGRAM(S): at 11:18

## 2018-01-06 RX ADMIN — Medication 100 MILLIGRAM(S): at 14:47

## 2018-01-06 RX ADMIN — ATORVASTATIN CALCIUM 40 MILLIGRAM(S): 80 TABLET, FILM COATED ORAL at 21:08

## 2018-01-06 RX ADMIN — Medication 3 MILLILITER(S): at 21:08

## 2018-01-06 RX ADMIN — Medication 600 MILLIGRAM(S): at 05:48

## 2018-01-06 RX ADMIN — Medication 600 MILLIGRAM(S): at 17:11

## 2018-01-06 RX ADMIN — Medication 3 MILLILITER(S): at 05:48

## 2018-01-06 RX ADMIN — LIDOCAINE 1 PATCH: 4 CREAM TOPICAL at 00:04

## 2018-01-06 RX ADMIN — APIXABAN 2.5 MILLIGRAM(S): 2.5 TABLET, FILM COATED ORAL at 17:12

## 2018-01-06 RX ADMIN — APIXABAN 2.5 MILLIGRAM(S): 2.5 TABLET, FILM COATED ORAL at 05:47

## 2018-01-06 RX ADMIN — PREGABALIN 1000 MICROGRAM(S): 225 CAPSULE ORAL at 11:18

## 2018-01-06 RX ADMIN — LIDOCAINE 1 PATCH: 4 CREAM TOPICAL at 11:18

## 2018-01-06 RX ADMIN — Medication 3 MILLILITER(S): at 17:13

## 2018-01-06 NOTE — PHYSICAL THERAPY INITIAL EVALUATION ADULT - PERTINENT HX OF CURRENT PROBLEM, REHAB EVAL
89 y/o female admitted to [facility] on 1/2/18 with h/o HTN, HLD, BPPV, left hip fracture s/p IM nailing (1997) who presented to ED after fall x2 and cough found to have CHERYL, and flu positive. (-) CTH, (-) hip xray, (-) foot xray, (-) CT cspine

## 2018-01-06 NOTE — PHYSICAL THERAPY INITIAL EVALUATION ADULT - DISCHARGE DISPOSITION, PT EVAL
subacute rehab for strengthening, endurance training, balance training, improve overall fxl mobility, DC plan to be emailed, CM to be ntoified

## 2018-01-06 NOTE — PHYSICAL THERAPY INITIAL EVALUATION ADULT - ADDITIONAL COMMENTS
Pt lvies alone in apt, owns rolling walker, does not use, PTA ind amb and ADls, decreased endurance recently per dtr, limited distances during ambulation, +5 steps to enter with railing (split level home), +few steps to enter home, dtr assists with shopping

## 2018-01-07 DIAGNOSIS — R09.02 HYPOXEMIA: ICD-10-CM

## 2018-01-07 LAB
CULTURE RESULTS: SIGNIFICANT CHANGE UP
CULTURE RESULTS: SIGNIFICANT CHANGE UP
SPECIMEN SOURCE: SIGNIFICANT CHANGE UP
SPECIMEN SOURCE: SIGNIFICANT CHANGE UP

## 2018-01-07 PROCEDURE — 71045 X-RAY EXAM CHEST 1 VIEW: CPT | Mod: 26

## 2018-01-07 RX ADMIN — PREGABALIN 1000 MICROGRAM(S): 225 CAPSULE ORAL at 11:16

## 2018-01-07 RX ADMIN — Medication 600 MILLIGRAM(S): at 06:22

## 2018-01-07 RX ADMIN — LIDOCAINE 1 PATCH: 4 CREAM TOPICAL at 11:17

## 2018-01-07 RX ADMIN — Medication 3 MILLILITER(S): at 16:20

## 2018-01-07 RX ADMIN — ATORVASTATIN CALCIUM 40 MILLIGRAM(S): 80 TABLET, FILM COATED ORAL at 21:27

## 2018-01-07 RX ADMIN — Medication 3 MILLILITER(S): at 21:26

## 2018-01-07 RX ADMIN — Medication 3 MILLILITER(S): at 11:16

## 2018-01-07 RX ADMIN — Medication 3 MILLILITER(S): at 06:21

## 2018-01-07 RX ADMIN — Medication 600 MILLIGRAM(S): at 18:13

## 2018-01-07 RX ADMIN — LIDOCAINE 1 PATCH: 4 CREAM TOPICAL at 23:30

## 2018-01-07 RX ADMIN — LIDOCAINE 1 PATCH: 4 CREAM TOPICAL at 00:56

## 2018-01-07 RX ADMIN — APIXABAN 2.5 MILLIGRAM(S): 2.5 TABLET, FILM COATED ORAL at 06:22

## 2018-01-07 RX ADMIN — Medication 500 MILLIGRAM(S): at 11:16

## 2018-01-07 RX ADMIN — APIXABAN 2.5 MILLIGRAM(S): 2.5 TABLET, FILM COATED ORAL at 17:20

## 2018-01-08 DIAGNOSIS — I50.31 ACUTE DIASTOLIC (CONGESTIVE) HEART FAILURE: ICD-10-CM

## 2018-01-08 LAB
ANION GAP SERPL CALC-SCNC: 8 MMOL/L — SIGNIFICANT CHANGE UP (ref 5–17)
BUN SERPL-MCNC: 24 MG/DL — HIGH (ref 7–23)
CALCIUM SERPL-MCNC: 10 MG/DL — SIGNIFICANT CHANGE UP (ref 8.4–10.5)
CHLORIDE SERPL-SCNC: 102 MMOL/L — SIGNIFICANT CHANGE UP (ref 96–108)
CO2 SERPL-SCNC: 28 MMOL/L — SIGNIFICANT CHANGE UP (ref 22–31)
CREAT SERPL-MCNC: 0.71 MG/DL — SIGNIFICANT CHANGE UP (ref 0.5–1.3)
GLUCOSE SERPL-MCNC: 116 MG/DL — HIGH (ref 70–99)
HCT VFR BLD CALC: 35.9 % — SIGNIFICANT CHANGE UP (ref 34.5–45)
HGB BLD-MCNC: 10.8 G/DL — LOW (ref 11.5–15.5)
MCHC RBC-ENTMCNC: 29.7 PG — SIGNIFICANT CHANGE UP (ref 27–34)
MCHC RBC-ENTMCNC: 30.1 GM/DL — LOW (ref 32–36)
MCV RBC AUTO: 98.6 FL — SIGNIFICANT CHANGE UP (ref 80–100)
PLATELET # BLD AUTO: 226 K/UL — SIGNIFICANT CHANGE UP (ref 150–400)
POTASSIUM SERPL-MCNC: 4.8 MMOL/L — SIGNIFICANT CHANGE UP (ref 3.5–5.3)
POTASSIUM SERPL-SCNC: 4.8 MMOL/L — SIGNIFICANT CHANGE UP (ref 3.5–5.3)
RBC # BLD: 3.64 M/UL — LOW (ref 3.8–5.2)
RBC # FLD: 13.7 % — SIGNIFICANT CHANGE UP (ref 10.3–14.5)
SODIUM SERPL-SCNC: 138 MMOL/L — SIGNIFICANT CHANGE UP (ref 135–145)
WBC # BLD: 7.93 K/UL — SIGNIFICANT CHANGE UP (ref 3.8–10.5)
WBC # FLD AUTO: 7.93 K/UL — SIGNIFICANT CHANGE UP (ref 3.8–10.5)

## 2018-01-08 RX ORDER — FUROSEMIDE 40 MG
20 TABLET ORAL DAILY
Qty: 0 | Refills: 0 | Status: DISCONTINUED | OUTPATIENT
Start: 2018-01-08 | End: 2018-01-09

## 2018-01-08 RX ORDER — DILTIAZEM HCL 120 MG
240 CAPSULE, EXT RELEASE 24 HR ORAL DAILY
Qty: 0 | Refills: 0 | Status: DISCONTINUED | OUTPATIENT
Start: 2018-01-08 | End: 2018-01-09

## 2018-01-08 RX ADMIN — APIXABAN 2.5 MILLIGRAM(S): 2.5 TABLET, FILM COATED ORAL at 05:17

## 2018-01-08 RX ADMIN — PREGABALIN 1000 MICROGRAM(S): 225 CAPSULE ORAL at 11:53

## 2018-01-08 RX ADMIN — Medication 3 MILLILITER(S): at 05:06

## 2018-01-08 RX ADMIN — Medication 20 MILLIGRAM(S): at 13:34

## 2018-01-08 RX ADMIN — Medication 600 MILLIGRAM(S): at 22:33

## 2018-01-08 RX ADMIN — ATORVASTATIN CALCIUM 40 MILLIGRAM(S): 80 TABLET, FILM COATED ORAL at 22:33

## 2018-01-08 RX ADMIN — Medication 500 MILLIGRAM(S): at 11:52

## 2018-01-08 RX ADMIN — Medication 600 MILLIGRAM(S): at 05:17

## 2018-01-08 RX ADMIN — Medication 3 MILLILITER(S): at 17:29

## 2018-01-08 RX ADMIN — Medication 3 MILLILITER(S): at 22:33

## 2018-01-08 RX ADMIN — Medication 3 MILLILITER(S): at 11:52

## 2018-01-08 RX ADMIN — Medication 240 MILLIGRAM(S): at 11:52

## 2018-01-08 RX ADMIN — APIXABAN 2.5 MILLIGRAM(S): 2.5 TABLET, FILM COATED ORAL at 17:29

## 2018-01-08 NOTE — PROGRESS NOTE ADULT - PROBLEM SELECTOR PROBLEM 6
Altered mental status, unspecified altered mental status type

## 2018-01-08 NOTE — PROGRESS NOTE ADULT - PROBLEM SELECTOR PLAN 4
Cr. 1.3, baseline 0.6-0.7. Likely pre-renal dehydration given hemoconcentration vs intrinsic renal damage and ATN given viral induced sepsis. s/p 1LNS in ed. Will give additional Liter  - Check UA, urine lytes  - Trend BMP, monitor UOP
resolved s/p IVF
Cr. 1.3, baseline 0.6-0.7. Likely pre-renal dehydration given hemoconcentration vs intrinsic renal damage and ATN given viral induced sepsis. s/p 1LNS in ed. Will give additional Liter  - Check UA, urine lytes  - Trend BMP, monitor UOP
resolved s/p IVF

## 2018-01-08 NOTE — PROGRESS NOTE ADULT - PROBLEM SELECTOR PLAN 9
c/w Statin
Musculoskeletal in nature 2/2 fall.   Lidocaine patch to affected area, Tylenol as needed    11: Need for prophylactic measure  - HSQ for DVT ppx  - DASH diet  - PT consult when respiratory status improves
Musculoskeletal in nature 2/2 fall.   Lidocaine patch to affected area, Tylenol as needed    11: Need for prophylactic measure  - HSQ for DVT ppx  - DASH diet  - PT consult when respiratory status improves
c/w Statin

## 2018-01-08 NOTE — PROGRESS NOTE ADULT - PROBLEM SELECTOR PROBLEM 9
Hyperlipidemia, unspecified hyperlipidemia type
Chest wall pain
Chest wall pain
Hyperlipidemia, unspecified hyperlipidemia type

## 2018-01-08 NOTE — PROGRESS NOTE ADULT - PROBLEM SELECTOR PLAN 6
Likely medication induced hallucinations given that pt was incorrectly taking Robitussion DM at home. Hallucinations now resolved and Daughter reports pt back to normal mental status.

## 2018-01-08 NOTE — PROGRESS NOTE ADULT - PROBLEM SELECTOR PLAN 5
0.48 on admission. New atrial fibrillation on presentation. Now in NSR. Troponin likely demand in setting new onset atrial fibrillation with RVR and hypotension and decreased clearance given CHERYL. Chest pain free.   - Trend Troponins to peak  - Daily EKG
0.48 on admission. New atrial fibrillation on presentation. Now in NSR. Troponin likely demand in setting new onset atrial fibrillation with RVR and hypotension and decreased clearance given CHERYL. Chest pain free.
0.48 on admission. New atrial fibrillation on presentation. Now in NSR. Troponin likely demand in setting new onset atrial fibrillation with RVR and hypotension and decreased clearance given CHERYL. Chest pain free.
0.48 on admission. New atrial fibrillation on presentation. Now in NSR. Troponin likely demand in setting new onset atrial fibrillation with RVR and hypotension and decreased clearance given CHERYL. Chest pain free.   - Trend Troponins to peak  - Daily EKG
eliquis and cardizem 240 mg daily  TTE noted  appreciate cards
eliquis and cardizem 60 mg PO q6h  TTE noted  appreciate cards

## 2018-01-08 NOTE — PROGRESS NOTE ADULT - PROBLEM SELECTOR PLAN 3
Likely 2/2 viral induced Bronchospasm. Currently on NRB. Titrate down oxygen as tolerated.   - Duonebs Q6  - check Echo
Likely 2/2 viral induced Bronchospasm.   - Duonebs Q6  - mucinex, tessalon
2' influenza  has resolved
2' influenza  has resolved
Likely 2/2 viral induced Bronchospasm.   - Duonebs Q6  - mucinex, tessalon
Likely 2/2 viral induced Bronchospasm. Currently on NRB. Titrate down oxygen as tolerated.   - Duonebs Q6  - check Echo

## 2018-01-08 NOTE — PROGRESS NOTE ADULT - PROBLEM SELECTOR PROBLEM 3
SOB (shortness of breath)
Sepsis, due to unspecified organism
Sepsis, due to unspecified organism
SOB (shortness of breath)

## 2018-01-08 NOTE — PROGRESS NOTE ADULT - PROBLEM SELECTOR PROBLEM 8
Atrial fibrillation, unspecified type
Hyperlipidemia, unspecified hyperlipidemia type
Hyperlipidemia, unspecified hyperlipidemia type

## 2018-01-08 NOTE — PROGRESS NOTE ADULT - PROBLEM SELECTOR PROBLEM 4
CHERYL (acute kidney injury)

## 2018-01-09 ENCOUNTER — TRANSCRIPTION ENCOUNTER (OUTPATIENT)
Age: 83
End: 2018-01-09

## 2018-01-09 VITALS
DIASTOLIC BLOOD PRESSURE: 58 MMHG | HEART RATE: 73 BPM | RESPIRATION RATE: 20 BRPM | OXYGEN SATURATION: 96 % | TEMPERATURE: 99 F | SYSTOLIC BLOOD PRESSURE: 127 MMHG

## 2018-01-09 LAB
ANION GAP SERPL CALC-SCNC: 6 MMOL/L — SIGNIFICANT CHANGE UP (ref 5–17)
BUN SERPL-MCNC: 26 MG/DL — HIGH (ref 7–23)
CALCIUM SERPL-MCNC: 9.7 MG/DL — SIGNIFICANT CHANGE UP (ref 8.4–10.5)
CHLORIDE SERPL-SCNC: 104 MMOL/L — SIGNIFICANT CHANGE UP (ref 96–108)
CO2 SERPL-SCNC: 28 MMOL/L — SIGNIFICANT CHANGE UP (ref 22–31)
CREAT SERPL-MCNC: 0.66 MG/DL — SIGNIFICANT CHANGE UP (ref 0.5–1.3)
GLUCOSE SERPL-MCNC: 103 MG/DL — HIGH (ref 70–99)
POTASSIUM SERPL-MCNC: 4.9 MMOL/L — SIGNIFICANT CHANGE UP (ref 3.5–5.3)
POTASSIUM SERPL-SCNC: 4.9 MMOL/L — SIGNIFICANT CHANGE UP (ref 3.5–5.3)
SODIUM SERPL-SCNC: 138 MMOL/L — SIGNIFICANT CHANGE UP (ref 135–145)

## 2018-01-09 PROCEDURE — 85610 PROTHROMBIN TIME: CPT

## 2018-01-09 PROCEDURE — 82435 ASSAY OF BLOOD CHLORIDE: CPT

## 2018-01-09 PROCEDURE — 73620 X-RAY EXAM OF FOOT: CPT

## 2018-01-09 PROCEDURE — 84484 ASSAY OF TROPONIN QUANT: CPT

## 2018-01-09 PROCEDURE — 87581 M.PNEUMON DNA AMP PROBE: CPT

## 2018-01-09 PROCEDURE — 87798 DETECT AGENT NOS DNA AMP: CPT

## 2018-01-09 PROCEDURE — 84295 ASSAY OF SERUM SODIUM: CPT

## 2018-01-09 PROCEDURE — 94640 AIRWAY INHALATION TREATMENT: CPT

## 2018-01-09 PROCEDURE — 82553 CREATINE MB FRACTION: CPT

## 2018-01-09 PROCEDURE — 82947 ASSAY GLUCOSE BLOOD QUANT: CPT

## 2018-01-09 PROCEDURE — 83605 ASSAY OF LACTIC ACID: CPT

## 2018-01-09 PROCEDURE — 84132 ASSAY OF SERUM POTASSIUM: CPT

## 2018-01-09 PROCEDURE — 85014 HEMATOCRIT: CPT

## 2018-01-09 PROCEDURE — 93005 ELECTROCARDIOGRAM TRACING: CPT

## 2018-01-09 PROCEDURE — 80048 BASIC METABOLIC PNL TOTAL CA: CPT

## 2018-01-09 PROCEDURE — 82550 ASSAY OF CK (CPK): CPT

## 2018-01-09 PROCEDURE — 80061 LIPID PANEL: CPT

## 2018-01-09 PROCEDURE — 72125 CT NECK SPINE W/O DYE: CPT

## 2018-01-09 PROCEDURE — 82962 GLUCOSE BLOOD TEST: CPT

## 2018-01-09 PROCEDURE — 70450 CT HEAD/BRAIN W/O DYE: CPT

## 2018-01-09 PROCEDURE — 84443 ASSAY THYROID STIM HORMONE: CPT

## 2018-01-09 PROCEDURE — 82803 BLOOD GASES ANY COMBINATION: CPT

## 2018-01-09 PROCEDURE — 87486 CHLMYD PNEUM DNA AMP PROBE: CPT

## 2018-01-09 PROCEDURE — 84100 ASSAY OF PHOSPHORUS: CPT

## 2018-01-09 PROCEDURE — 85730 THROMBOPLASTIN TIME PARTIAL: CPT

## 2018-01-09 PROCEDURE — 93306 TTE W/DOPPLER COMPLETE: CPT

## 2018-01-09 PROCEDURE — 83735 ASSAY OF MAGNESIUM: CPT

## 2018-01-09 PROCEDURE — 73522 X-RAY EXAM HIPS BI 3-4 VIEWS: CPT

## 2018-01-09 PROCEDURE — 99285 EMERGENCY DEPT VISIT HI MDM: CPT | Mod: 25

## 2018-01-09 PROCEDURE — 83880 ASSAY OF NATRIURETIC PEPTIDE: CPT

## 2018-01-09 PROCEDURE — 87633 RESP VIRUS 12-25 TARGETS: CPT

## 2018-01-09 PROCEDURE — 71045 X-RAY EXAM CHEST 1 VIEW: CPT

## 2018-01-09 PROCEDURE — 82330 ASSAY OF CALCIUM: CPT

## 2018-01-09 PROCEDURE — 80053 COMPREHEN METABOLIC PANEL: CPT

## 2018-01-09 PROCEDURE — 87040 BLOOD CULTURE FOR BACTERIA: CPT

## 2018-01-09 PROCEDURE — 85027 COMPLETE CBC AUTOMATED: CPT

## 2018-01-09 PROCEDURE — 83036 HEMOGLOBIN GLYCOSYLATED A1C: CPT

## 2018-01-09 PROCEDURE — 97161 PT EVAL LOW COMPLEX 20 MIN: CPT

## 2018-01-09 RX ORDER — PREGABALIN 225 MG/1
1 CAPSULE ORAL
Qty: 0 | Refills: 0 | COMMUNITY
Start: 2018-01-09

## 2018-01-09 RX ORDER — DILTIAZEM HCL 120 MG
1 CAPSULE, EXT RELEASE 24 HR ORAL
Qty: 0 | Refills: 0 | COMMUNITY
Start: 2018-01-09

## 2018-01-09 RX ORDER — LIDOCAINE 4 G/100G
1 CREAM TOPICAL
Qty: 0 | Refills: 0 | COMMUNITY
Start: 2018-01-09

## 2018-01-09 RX ORDER — ATORVASTATIN CALCIUM 80 MG/1
1 TABLET, FILM COATED ORAL
Qty: 0 | Refills: 0 | COMMUNITY
Start: 2018-01-09

## 2018-01-09 RX ORDER — FUROSEMIDE 40 MG
1.5 TABLET ORAL
Qty: 0 | Refills: 0 | COMMUNITY

## 2018-01-09 RX ORDER — ASCORBIC ACID 60 MG
1 TABLET,CHEWABLE ORAL
Qty: 0 | Refills: 0 | COMMUNITY
Start: 2018-01-09

## 2018-01-09 RX ADMIN — PREGABALIN 1000 MICROGRAM(S): 225 CAPSULE ORAL at 14:25

## 2018-01-09 RX ADMIN — Medication 240 MILLIGRAM(S): at 06:03

## 2018-01-09 RX ADMIN — Medication 500 MILLIGRAM(S): at 14:24

## 2018-01-09 RX ADMIN — Medication 600 MILLIGRAM(S): at 06:03

## 2018-01-09 RX ADMIN — LIDOCAINE 1 PATCH: 4 CREAM TOPICAL at 14:24

## 2018-01-09 RX ADMIN — APIXABAN 2.5 MILLIGRAM(S): 2.5 TABLET, FILM COATED ORAL at 06:03

## 2018-01-09 RX ADMIN — Medication 3 MILLILITER(S): at 09:17

## 2018-01-09 RX ADMIN — Medication 20 MILLIGRAM(S): at 06:03

## 2018-01-09 RX ADMIN — Medication 3 MILLILITER(S): at 06:01

## 2018-01-09 NOTE — PROGRESS NOTE ADULT - PROBLEM SELECTOR PLAN 2
2' influenza  has resolved
-  - supportive care per primary team.    Liam Bae M.D., [facility]  [phone]
-  - supportive care per primary team.    Liam Bae M.D., [facility]  [phone]
-  - supportive care per primary team.    Liam Bae M.D., [facility]  [phone]
-  - supportive care per primary team.    Liam Bae M.D., [facility]  [phone]
- Held off on Tamiflu as presentation >48 after initial symptoms.  - Duonebs, tessalon perrles, mucinex
- Holding off on Tamiflu as presentation >48 after initial symptoms.  - repeat CXR today  - Duonebs, tessalon perrles, mucinex
2' influenza  has resolved
Pt presented with Tachycardia to 120's, hypotension and lactic acidosis to 2.7 with RVP positive for Influenza A meeting severe sepsis criteria  - Symptom management with Duonebs, supplemental O2 as needed, tylenol as needed
Pt presented with Tachycardia to 120's, hypotension and lactic acidosis to 2.7 with RVP positive for Influenza A meeting severe sepsis criteria  - Symptom management with Duonebs, supplemental O2 as needed, tylenol as needed

## 2018-01-09 NOTE — PROGRESS NOTE ADULT - ASSESSMENT
89 F h/o HTN, HLD, BPPV, left hip fracture s/p IMN (1997) a/w falls x 2, influenza A an new onset Afib.    Paroxysmal atrial fibrillation.   currently in AF but rate controlled  Cont. cardizem to 60 mg po q6h.  cont eliquis 2.5 mg po BID.     Problem: Influenza A.    supportive care per primary team.
89 y/o female with h/o HTN, HLD, BPPV, left hip fracture s/p IM nailing (1997) who presented to ED after fall x2 and cough found to have CHERYL, and flu positive
89 y/o female with h/o HTN, HLD, BPPV, left hip fracture s/p IM nailing (1997) who presented to ED after fall x2 and cough found to have CHERYL, and flu positive
89 y/o female with h/o HTN, HLD, BPPV, left hip fracture s/p IM nailing (1997) who presented to ED after fall x2 and cough found to have CHERYL, and flu positive
89 F h/o HTN, HLD, BPPV, left hip fracture s/p IMN (1997) a/w falls x 2, influenza A an new onset Afib.
89 F h/o HTN, HLD, BPPV, left hip fracture s/p IMN (1997) a/w falls x 2, influenza A an new onset Afib.    Paroxysmal atrial fibrillation.   rater controlled   Cont. cardizem to 60 mg po q6h.  change to cd on discharge  cont eliquis 2.5 mg po BID.     Problem: Influenza A.    supportive care per primary team.    dvt ppx
89 y/o female with h/o HTN, HLD, BPPV, left hip fracture s/p IM nailing (1997) who presented to ED after fall x2 and cough found to have CHERYL, and flu positive
89 y/o female with h/o HTN, HLD, BPPV, left hip fracture s/p IM nailing (1997) who presented to ED after fall x2 and cough found to have CHERYL, and flu positive
89 y/o female with h/o HTN, HLD, BPPV, left hip fracture s/p IM nailing (1997) who presented to ED after fall x2 and cough found to have CHERYL, and flu positive

## 2018-01-09 NOTE — PROGRESS NOTE ADULT - PROBLEM SELECTOR PROBLEM 1
Influenza A
Acute diastolic (congestive) heart failure
Hypoxia
Influenza A
Influenza A
Paroxysmal atrial fibrillation
Influenza A

## 2018-01-09 NOTE — PROGRESS NOTE ADULT - PROBLEM SELECTOR PLAN 1
- Holding off on Tamiflu as presentation >48 after initial symptoms.  - symptomatic management  - Duonebs, tessalon perrles, mucinex
-  - AF rates well controlled.  - can change to cardizem  mg po daily.  - cont eliquis 2.5 mg po BID.  - echo results previously reviewed.
-  - agree with increase cardizem to 60 mg po q6h  - Discussed risks/benefits/alternatives of AC with a NOAC, including bleeding risks and patient expressed understanding. Patient is agreeable to proceed with AC for primary stroke prevention.   - Will start eliquis 2.5 mg po BID. (Age >80, weight < 60 kg).
-  - cont cardizem  mg po daily for now. If rates need better control can increase to 300 mg for tomorrow.  - cont eliquis 2.5 mg po BID.  - echo results previously reviewed.
-  - currently sinus bethanie/sinus rhythm.  - Cont. cardizem to 60 mg po q6h.  - cont eliquis 2.5 mg po BID.
- Holding off on Tamiflu as presentation >48 after initial symptoms.  - symptomatic management  - Duonebs, tessalon perrles, mucinex
Pt found to be flu positive on RVP, likely the etiology of her cough. no other symptoms, no myalgia, fever, chills, N/V. Only reports decreased appetite.   - Holding off on Tamiflu as presentation >48 after initial symptoms.  - symptomatic management  - Duonebs, tessalon perrles, mucinex
lasix was hold 2' sepsis  started IV lasix 20 mg daily  CXR looks more like failure to me than PNA
will repeat CXR today to f/u left pleural effusion  symptomatic Rx of influenza  TTE results noted
Pt found to be flu positive on RVP, likely the etiology of her cough. no other symptoms, no myalgia, fever, chills, N/V. Only reports decreased appetite.   - WIll hold off on Tamiflu as presentation >48 after initial symptoms.  - symptomatic management  - Duonebs, tessalon perrles, mucinex

## 2018-01-09 NOTE — DISCHARGE NOTE ADULT - ADDITIONAL INSTRUCTIONS
Follow up with your Cardiologist and PMD within 1 week of discharge. Follow up with your Cardiologist and PMD within 1 week of discharge.  Please try to wean patient off Nasal canula at rehab.

## 2018-01-09 NOTE — PROGRESS NOTE ADULT - PROVIDER SPECIALTY LIST ADULT
Cardiology
Internal Medicine
Cardiology
Internal Medicine
Internal Medicine

## 2018-01-09 NOTE — DISCHARGE NOTE ADULT - CARE PROVIDER_API CALL
Gill Jenkins (MD), Cardiovascular Disease; Internal Medicine  [address] [place] [address]  Phone: [phone]  Fax: [phone]

## 2018-01-09 NOTE — DISCHARGE NOTE ADULT - HOSPITAL COURSE
89 y/o female with h/o HTN, HLD, BPPV, left hip fracture s/p IM nailing (1997) who presented to ED after fall x2 and cough found to have CHERYL, and flu positive     Problem/Plan - 1:  ·  Problem: Acute diastolic (congestive) heart failure.  Plan: lasix was hold 2' sepsis  started IV lasix 20 mg daily  CXR looks more like failure to me than PNA.      Problem/Plan - 2:  ·  Problem: Influenza A.  Plan: - Held off on Tamiflu as presentation >48 after initial symptoms.  - Duonebs, tessalon perrles, mucinex.      Problem/Plan - 3:  ·  Problem: Sepsis, due to unspecified organism.  Plan: 2' influenza  has resolved.      Problem/Plan - 4:  ·  Problem: CHERYL (acute kidney injury).  Plan: resolved s/p IVF.      Problem/Plan - 5:  ·  Problem: Atrial fibrillation, unspecified type.  Plan: eliquis and cardizem 240 mg daily  TTE noted  appreciate cards.      Problem/Plan - 6:  Problem: Altered mental status, unspecified altered mental status type. Plan: Likely medication induced hallucinations given that pt was incorrectly taking Robitussion DM at home. Hallucinations now resolved and Daughter reports pt back to normal mental status.     Problem/Plan - 7:  ·  Problem: Essential hypertension.  Plan: Hypotensive on admission with SBP in 80-90  - Hold Losartan, Norvasc and Atenolol  - lasix 20 mg IV daily.      Problem/Plan - 8:  ·  Problem: Hyperlipidemia, unspecified hyperlipidemia type.  Plan: c/w Statin.      Problem/Plan - 9:  ·  Problem: Chest wall pain.  Plan: Musculoskeletal in nature 2/2 fall.   Lidocaine patch to affected area, Tylenol as needed 89 y/o female with h/o HTN, HLD, BPPV, left hip fracture s/p IM nailing (1997) who presented to ED after fall x2 and cough found to have CHERYL, and flu positive     Problem/Plan - 1:  ·  Problem: Acute diastolic (congestive) heart failure.  Plan: lasix was hold 2' sepsis   lasix increased to 30 mg daily     Problem/Plan - 2:  ·  Problem: Influenza A.  Plan: - Held off on Tamiflu as presentation >48 after initial symptoms.  - Duonebs, tessalon perrles, mucinex.      Problem/Plan - 3:  ·  Problem: Sepsis, due to unspecified organism.  Plan: 2' influenza  has resolved.      Problem/Plan - 4:  ·  Problem: CHERYL (acute kidney injury).  Plan: resolved s/p IVF.      Problem/Plan - 5:  ·  Problem: Atrial fibrillation, unspecified type.  Plan: eliquis and cardizem 240 mg daily  TTE noted   Problem/Plan - 6:  Problem: Altered mental status, unspecified altered mental status type. Plan: Likely medication induced hallucinations given that pt was incorrectly taking Robitussion DM at home. Hallucinations now resolved and Daughter reports pt back to normal mental status.     Problem/Plan - 7:  ·  Problem: Essential hypertension.  Plan: Hypotensive on admission with SBP in 80-90  - Hold Losartan, Norvasc and Atenolol  - lasix 30mg po daily.      Problem/Plan - 8:  ·  Problem: Hyperlipidemia, unspecified hyperlipidemia type.  Plan: c/w Statin.      Problem/Plan - 9:  ·  Problem: Chest wall pain.  Plan: Musculoskeletal in nature 2/2 fall.   Lidocaine patch to affected area, Tylenol as needed  ]    Pt stable discharged to rehab, with outpatient follow up with PMD and Cardiologist within 1 week of discharge.

## 2018-01-09 NOTE — DISCHARGE NOTE ADULT - MEDICATION SUMMARY - MEDICATIONS TO TAKE
I will START or STAY ON the medications listed below when I get home from the hospital:    dilTIAZem 240 mg/24 hours oral capsule, extended release  -- 1 cap(s) by mouth once a day  -- Indication: For Afib    apixaban 2.5 mg oral tablet  -- 1 tab(s) by mouth every 12 hours  -- Indication: For Afib    atorvastatin 40 mg oral tablet  -- 1 tab(s) by mouth once a day (at bedtime)  -- Indication: For High Cholesterol    benzonatate 100 mg oral capsule  -- 1 cap(s) by mouth every 8 hours, As needed, Cough  -- Indication: For Cough    lidocaine 5% topical film  -- Apply on skin to affected area once a day  -- Indication: For Topical agent    furosemide 20 mg oral tablet  -- 1 tab(s) by mouth once a day  -- Indication: For Diuretic    guaiFENesin 600 mg oral tablet, extended release  -- 1 tab(s) by mouth every 12 hours  -- Indication: For Cough    PreserVision oral tablet  -- Indication: For Vitamin    multivitamin  -- Indication: For Vitamin    cyanocobalamin 1000 mcg oral tablet  -- 1 tab(s) by mouth once a day  -- Indication: For Vitamin    ascorbic acid 500 mg oral tablet  -- 1 tab(s) by mouth once a day  -- Indication: For Vitamin I will START or STAY ON the medications listed below when I get home from the hospital:    dilTIAZem 240 mg/24 hours oral capsule, extended release  -- 1 cap(s) by mouth once a day  -- Indication: For Afib    apixaban 2.5 mg oral tablet  -- 1 tab(s) by mouth every 12 hours  -- Indication: For Afib    atorvastatin 40 mg oral tablet  -- 1 tab(s) by mouth once a day (at bedtime)  -- Indication: For High Cholesterol    benzonatate 100 mg oral capsule  -- 1 cap(s) by mouth every 8 hours, As needed, Cough  -- Indication: For Cough    lidocaine 5% topical film  -- Apply on skin to affected area once a day  -- Indication: For Topical agent    furosemide 20 mg oral tablet  -- 1.5 tab(s) by mouth once a day  -- Indication: For Diuretic    guaiFENesin 600 mg oral tablet, extended release  -- 1 tab(s) by mouth every 12 hours  -- Indication: For Topical agent    PreserVision oral tablet  -- Indication: For Vitamin    multivitamin  -- Indication: For Vitamin    cyanocobalamin 1000 mcg oral tablet  -- 1 tab(s) by mouth once a day  -- Indication: For Vitamin    ascorbic acid 500 mg oral tablet  -- 1 tab(s) by mouth once a day  -- Indication: For Vitamin

## 2018-01-09 NOTE — DISCHARGE NOTE ADULT - SECONDARY DIAGNOSIS.
Paroxysmal atrial fibrillation Influenza A Hyperlipidemia, unspecified hyperlipidemia type Essential hypertension

## 2018-01-09 NOTE — DISCHARGE NOTE ADULT - PLAN OF CARE
Patient remains hemodynamically stable. Discharged to rehab for strengthening. Atrial fibrillation is the most common heart rhythm problem & has the risk of stroke & heart attack  It helps if you control your blood pressure, not drink more than 1-2 alcohol drinks per day, cut down on caffeine, getting treatment for over active thyroid gland, & getting exercise  Call your doctor if you feel your heart racing or beating unusually, chest tightness or pain, lightheaded, faint, shortness of breath especially with exercise  It is important to take your heart medication as prescribed  You may be on anticoagulation which is very important to take as directed - you may need blood work to monitor drug levels Stable,  supportive care Stable Low salt diet  Activity as tolerated.  Take all medication as prescribed.  Follow up with your medical doctor for routine blood pressure monitoring at your next visit.  Notify your doctor if you have any of the following symptoms:   Dizziness, Lightheadedness, Blurry vision, Headache, Chest pain, Shortness of breath

## 2018-01-09 NOTE — DISCHARGE NOTE ADULT - MEDICATION SUMMARY - MEDICATIONS TO CHANGE
I will SWITCH the dose or number of times a day I take the medications listed below when I get home from the hospital:  None I will SWITCH the dose or number of times a day I take the medications listed below when I get home from the hospital:    furosemide 20 mg oral tablet  -- 1 tab(s) by mouth once a day

## 2018-01-09 NOTE — DISCHARGE NOTE ADULT - MEDICATION SUMMARY - MEDICATIONS TO STOP TAKING
I will STOP taking the medications listed below when I get home from the hospital:    losartan 100 mg oral tablet  -- 1 tab(s) by mouth once a day    atenolol 100 mg oral tablet  -- 1 tab(s) by mouth once a day    amLODIPine 10 mg oral tablet  -- 1 tab(s) by mouth once a day

## 2018-01-09 NOTE — DISCHARGE NOTE ADULT - PATIENT PORTAL LINK FT
“You can access the [employer] Patient Portal, offered by [facility], by registering with the following website: [email or web address]”

## 2018-01-09 NOTE — PROGRESS NOTE ADULT - PROBLEM SELECTOR PROBLEM 2
Sepsis, due to unspecified organism
Influenza A
Sepsis, due to unspecified organism

## 2018-01-09 NOTE — DISCHARGE NOTE ADULT - CARE PLAN
Principal Discharge DX:	Falls, initial encounter  Goal:	Patient remains hemodynamically stable.  Instructions for follow-up, activity and diet:	Discharged to rehab for strengthening.  Secondary Diagnosis:	Paroxysmal atrial fibrillation  Instructions for follow-up, activity and diet:	Atrial fibrillation is the most common heart rhythm problem & has the risk of stroke & heart attack  It helps if you control your blood pressure, not drink more than 1-2 alcohol drinks per day, cut down on caffeine, getting treatment for over active thyroid gland, & getting exercise  Call your doctor if you feel your heart racing or beating unusually, chest tightness or pain, lightheaded, faint, shortness of breath especially with exercise  It is important to take your heart medication as prescribed  You may be on anticoagulation which is very important to take as directed - you may need blood work to monitor drug levels  Secondary Diagnosis:	Influenza A  Instructions for follow-up, activity and diet:	Stable,  supportive care  Secondary Diagnosis:	Hyperlipidemia, unspecified hyperlipidemia type  Instructions for follow-up, activity and diet:	Stable  Secondary Diagnosis:	Essential hypertension Principal Discharge DX:	Falls, initial encounter  Goal:	Patient remains hemodynamically stable.  Instructions for follow-up, activity and diet:	Discharged to rehab for strengthening.  Secondary Diagnosis:	Paroxysmal atrial fibrillation  Instructions for follow-up, activity and diet:	Atrial fibrillation is the most common heart rhythm problem & has the risk of stroke & heart attack  It helps if you control your blood pressure, not drink more than 1-2 alcohol drinks per day, cut down on caffeine, getting treatment for over active thyroid gland, & getting exercise  Call your doctor if you feel your heart racing or beating unusually, chest tightness or pain, lightheaded, faint, shortness of breath especially with exercise  It is important to take your heart medication as prescribed  You may be on anticoagulation which is very important to take as directed - you may need blood work to monitor drug levels  Secondary Diagnosis:	Influenza A  Instructions for follow-up, activity and diet:	Stable,  supportive care  Secondary Diagnosis:	Hyperlipidemia, unspecified hyperlipidemia type  Instructions for follow-up, activity and diet:	Stable  Secondary Diagnosis:	Essential hypertension  Instructions for follow-up, activity and diet:	Low salt diet  Activity as tolerated.  Take all medication as prescribed.  Follow up with your medical doctor for routine blood pressure monitoring at your next visit.  Notify your doctor if you have any of the following symptoms:   Dizziness, Lightheadedness, Blurry vision, Headache, Chest pain, Shortness of breath

## 2018-01-09 NOTE — PROGRESS NOTE ADULT - SUBJECTIVE AND OBJECTIVE BOX
CARDIOLOGY FOLLOW UP NOTE - DR. ALEJANDRA (for [facility])    Subjective:      no cp, sob, palps    PHYSICAL EXAM:  T(C): 36.7 (01-06-18 @ 12:49), Max: 36.9 (01-05-18 @ 22:25)  HR: 78 (01-06-18 @ 12:49) (78 - 110)  BP: 117/51 (01-06-18 @ 12:49) (108/61 - 163/65)  RR: 18 (01-06-18 @ 12:49) (18 - 18)  SpO2: 93% (01-06-18 @ 12:49) (93% - 97%)  Wt(kg): --  I&O's Summary    05 Jan 2018 07:01  -  06 Jan 2018 07:00  --------------------------------------------------------  IN: 300 mL / OUT: 650 mL / NET: -350 mL    06 Jan 2018 07:01  -  06 Jan 2018 13:13  --------------------------------------------------------  IN: 360 mL / OUT: 200 mL / NET: 160 mL        Appearance: Normal	  Cardiovascular: Normal S1 S2,RRR, sm  respiratory: Lungs clear to auscultation	  Gastrointestinal:  Soft, Non-tender, + BS	  Extremities: Normal range of motion, no edema    MEDICATIONS  (STANDING):  ALBUTerol/ipratropium for Nebulization 3 milliLiter(s) Nebulizer every 6 hours  apixaban 2.5 milliGRAM(s) Oral every 12 hours  ascorbic acid 500 milliGRAM(s) Oral daily  atorvastatin 40 milliGRAM(s) Oral at bedtime  cyanocobalamin 1000 MICROGram(s) Oral daily  diltiazem    Tablet 60 milliGRAM(s) Oral every 6 hours  guaiFENesin  milliGRAM(s) Oral every 12 hours  lidocaine   Patch 1 Patch Transdermal daily      TELEMETRY: 	    ECG:  	  RADIOLOGY:   DIAGNOSTIC TESTING:  [ ] Echocardiogram:  [ ] Catheterization:  [ ] Stress Test:    OTHER: 	    LABS:	 	    CARDIAC MARKERS:                                11.0   6.82  )-----------( 167      ( 06 Jan 2018 08:24 )             35.5     01-06    142  |  108  |  31<H>  ----------------------------<  137<H>  4.4   |  25  |  0.72    Ca    10.2      06 Jan 2018 08:33      proBNP:     Lipid Profile:   HgA1c:
In NSR this AM; no CP, SOB or palpitations/dizziness    Vital Signs Last 24 Hrs  T(C): 36.7 (05 Jan 2018 04:37), Max: 36.7 (04 Jan 2018 13:20)  T(F): 98 (05 Jan 2018 04:37), Max: 98 (04 Jan 2018 13:20)  HR: 70 (05 Jan 2018 05:06) (63 - 72)  BP: 133/67 (05 Jan 2018 04:37) (131/66 - 153/60)  BP(mean): --  RR: 17 (05 Jan 2018 04:37) (16 - 17)  SpO2: 94% (05 Jan 2018 04:37) (93% - 94%)    General: WDWN resting comfortably in bed; NAD  Head: NC/AT  Eyes: PERRL, EOMI, anicteric sclera  ENT: no nasal discharge; uvula midline, no oropharyngeal erythema or exudates; MMM  Neck: supple; no JVD or thyromegaly  Respiratory: Decreased breath sounds bilaterally. + Pain on palpation over right anterior chest  Cardiac: RRR, no murmurs  Gastrointestinal: soft, NT/ND; no rebound or guarding; +BSx4  Back: spine midline, no bony tenderness or step-offs; no CVAT B/L  Extremities: WWP, no clubbing or cyanosis; no peripheral     LABS:                        10.4   6.96  )-----------( 136      ( 05 Jan 2018 07:49 )             34.5     01-05    144  |  108  |  33<H>  ----------------------------<  105<H>  4.5   |  26  |  0.71    Ca    9.9      05 Jan 2018 08:08  Phos  2.1     01-04  Mg     2.0     01-04      PT/INR - ( 04 Jan 2018 07:49 )   PT: 11.5 sec;   INR: 1.02 ratio         PTT - ( 04 Jan 2018 07:49 )  PTT:43.7 sec  CAPILLARY BLOOD GLUCOSE
CARDIOLOGY FOLLOW UP NOTE - DR. ALEJANDRA (for [facility])    Subjective:    no new complaints    PHYSICAL EXAM:  T(C): 36.8 (01-07-18 @ 05:09), Max: 36.9 (01-06-18 @ 20:37)  HR: 128 (01-07-18 @ 05:09) (74 - 128)  BP: 123/77 (01-07-18 @ 05:09) (121/52 - 123/77)  RR: 18 (01-07-18 @ 05:09) (18 - 18)  SpO2: 91% (01-07-18 @ 05:09) (90% - 93%)  Wt(kg): --  I&O's Summary    06 Jan 2018 07:01  -  07 Jan 2018 07:00  --------------------------------------------------------  IN: 720 mL / OUT: 200 mL / NET: 520 mL    07 Jan 2018 07:01  -  07 Jan 2018 14:16  --------------------------------------------------------  IN: 360 mL / OUT: 0 mL / NET: 360 mL        Appearance: Normal	  Cardiovascular: Normal S1 S2,RRR, No JVD, No murmurs  Respiratory: Lungs clear to auscultation	  Gastrointestinal:  Soft, Non-tender, + BS	  Extremities: Normal range of motion, No clubbing, cyanosis or edema      MEDICATIONS  (STANDING):  ALBUTerol/ipratropium for Nebulization 3 milliLiter(s) Nebulizer every 6 hours  apixaban 2.5 milliGRAM(s) Oral every 12 hours  ascorbic acid 500 milliGRAM(s) Oral daily  atorvastatin 40 milliGRAM(s) Oral at bedtime  cyanocobalamin 1000 MICROGram(s) Oral daily  diltiazem    Tablet 60 milliGRAM(s) Oral every 6 hours  guaiFENesin  milliGRAM(s) Oral every 12 hours  lidocaine   Patch 1 Patch Transdermal daily      TELEMETRY: 	    ECG:  	  RADIOLOGY:   DIAGNOSTIC TESTING:  [ ] Echocardiogram:  [ ] Catheterization:  [ ] Stress Test:    OTHER: 	    LABS:	 	    CARDIAC MARKERS:                                11.0   6.82  )-----------( 167      ( 06 Jan 2018 08:24 )             35.5     01-06    142  |  108  |  31<H>  ----------------------------<  137<H>  4.4   |  25  |  0.72    Ca    10.2      06 Jan 2018 08:33      proBNP:     Lipid Profile:   HgA1c:
Denies any acute SOB    Vital Signs Last 24 Hrs  T(C): 36.8 (07 Jan 2018 05:09), Max: 36.9 (06 Jan 2018 20:37)  T(F): 98.2 (07 Jan 2018 05:09), Max: 98.5 (06 Jan 2018 20:37)  HR: 128 (07 Jan 2018 05:09) (74 - 128)  BP: 123/77 (07 Jan 2018 05:09) (117/51 - 123/77)  BP(mean): --  RR: 18 (07 Jan 2018 05:09) (18 - 18)  SpO2: 91% (07 Jan 2018 05:09) (90% - 93%)    General: WDWN resting comfortably in bed; NAD  Head: NC/AT  Eyes: EOMI  Neck: supple; no JVD   Respiratory: Decreased breath sounds bilaterally.   Cardiac: IRRR, no murmurs  Gastrointestinal: soft, NT/ND; NABS  Extremities: no LE edema b/l  Neuro: no focal deficits    LABS:                        11.0   6.82  )-----------( 167      ( 06 Jan 2018 08:24 )             35.5     01-06    142  |  108  |  31<H>  ----------------------------<  137<H>  4.4   |  25  |  0.72    Ca    10.2      06 Jan 2018 08:33        CAPILLARY BLOOD GLUCOSE
Denies any cp or palpitations  Not acutely SOB but mildly hypoxic    Vital Signs Last 24 Hrs  T(C): 36.8 (08 Jan 2018 11:59), Max: 37.2 (07 Jan 2018 21:24)  T(F): 98.2 (08 Jan 2018 11:59), Max: 98.9 (07 Jan 2018 21:24)  HR: 109 (08 Jan 2018 11:59) (108 - 120)  BP: 157/79 (08 Jan 2018 11:59) (121/70 - 157/79)  BP(mean): --  RR: 20 (08 Jan 2018 11:59) (20 - 20)  SpO2: 94% (08 Jan 2018 11:59) (94% - 94%)    General: WDWN resting comfortably in bed; NAD  Head: NC/AT  Eyes: EOMI  Neck: supple; no JVD   Respiratory: Decreased breath sounds bilaterally.   Cardiac: IRRR, no murmurs  Gastrointestinal: soft, NT/ND; NABS  Extremities: no LE edema b/l  Neuro: no focal deficits    LABS:                        10.8   7.93  )-----------( 226      ( 08 Jan 2018 08:59 )             35.9     01-08    138  |  102  |  24<H>  ----------------------------<  116<H>  4.8   |  28  |  0.71    Ca    10.0      08 Jan 2018 09:15        CAPILLARY BLOOD GLUCOSE
[facility] Cardiology Progress Note  _______________________________    Pt. seen and examined. No new cardiac-related complaints.    Telemetry -    T(C): 36.9 (01-08-18 @ 05:08), Max: 37.2 (01-07-18 @ 21:24)  HR: 111 (01-08-18 @ 05:08) (108 - 120)  BP: 121/70 (01-08-18 @ 05:08) (121/70 - 145/81)  RR: 20 (01-08-18 @ 05:08) (20 - 20)  SpO2: 94% (01-08-18 @ 05:08) (94% - 94%)  I&O's Summary    07 Jan 2018 07:01  -  08 Jan 2018 07:00  --------------------------------------------------------  IN: 1080 mL / OUT: 0 mL / NET: 1080 mL        PHYSICAL EXAM:  GENERAL: Elderly F, alert.  NECK: Supple, No JVD, No carotid bruit.  CHEST/LUNG: scattered wheeze/rhonchi.  HEART: S1 S2 normal, irreg. irreg rhythm.,  No murmurs, rubs, or gallops  ABDOMEN: Soft, Nontender, Nondistended; Bowel sounds present  EXTREMITIES:  No LE edema.    LABS:      CARDIAC MARKERS ( 02 Jan 2018 19:44 )  x     / 0.46 ng/mL / 56 U/L / x     / 4.7 ng/mL  CARDIAC MARKERS ( 02 Jan 2018 11:45 )  x     / 0.48 ng/mL / 62 U/L / x     / 3.4 ng/mL        MEDICATIONS  (STANDING):  ALBUTerol/ipratropium for Nebulization 3 milliLiter(s) Nebulizer every 6 hours  apixaban 2.5 milliGRAM(s) Oral every 12 hours  ascorbic acid 500 milliGRAM(s) Oral daily  atorvastatin 40 milliGRAM(s) Oral at bedtime  cyanocobalamin 1000 MICROGram(s) Oral daily  diltiazem    Tablet 60 milliGRAM(s) Oral every 6 hours  guaiFENesin  milliGRAM(s) Oral every 12 hours  lidocaine   Patch 1 Patch Transdermal daily    MEDICATIONS  (PRN):  acetaminophen   Tablet. 650 milliGRAM(s) Oral every 6 hours PRN Moderate Pain (4 - 6)  benzonatate 100 milliGRAM(s) Oral every 8 hours PRN Cough      RADIOLOGY & ADDITIONAL TESTS:
[facility] Cardiology Progress Note  _______________________________    Pt. seen and examined. No new cardiac-related complaints.    Telemetry - sinus 50-70's, PAC, PVC.    T(C): 36.7 (01-05-18 @ 04:37), Max: 36.7 (01-04-18 @ 13:20)  HR: 70 (01-05-18 @ 05:06) (63 - 72)  BP: 133/67 (01-05-18 @ 04:37) (131/66 - 153/60)  RR: 17 (01-05-18 @ 04:37) (16 - 17)  SpO2: 94% (01-05-18 @ 04:37) (93% - 94%)  I&O's Summary    04 Jan 2018 07:01  -  05 Jan 2018 07:00  --------------------------------------------------------  IN: 660 mL / OUT: 750 mL / NET: -90 mL        PHYSICAL EXAM:  GENERAL: Elderly F, alert.  NECK: Supple, No JVD, No carotid bruit.  CHEST/LUNG: scattered wheeze/rhonchi.  HEART: S1 S2 normal, irreg. irreg rhythm.,  No murmurs, rubs, or gallops  ABDOMEN: Soft, Nontender, Nondistended; Bowel sounds present  EXTREMITIES:  No LE edema.      LABS:                        10.4   6.96  )-----------( 136      ( 05 Jan 2018 07:49 )             34.5     01-05    144  |  108  |  33<H>  ----------------------------<  105<H>  4.5   |  26  |  0.71    Ca    9.9      05 Jan 2018 08:08  Phos  2.1     01-04  Mg     2.0     01-04      PT/INR - ( 04 Jan 2018 07:49 )   PT: 11.5 sec;   INR: 1.02 ratio         PTT - ( 04 Jan 2018 07:49 )  PTT:43.7 sec  CARDIAC MARKERS ( 02 Jan 2018 19:44 )  x     / 0.46 ng/mL / 56 U/L / x     / 4.7 ng/mL  CARDIAC MARKERS ( 02 Jan 2018 11:45 )  x     / 0.48 ng/mL / 62 U/L / x     / 3.4 ng/mL        MEDICATIONS  (STANDING):  ALBUTerol/ipratropium for Nebulization 3 milliLiter(s) Nebulizer every 6 hours  apixaban 2.5 milliGRAM(s) Oral every 12 hours  ascorbic acid 500 milliGRAM(s) Oral daily  atorvastatin 40 milliGRAM(s) Oral at bedtime  cyanocobalamin 1000 MICROGram(s) Oral daily  diltiazem    Tablet 60 milliGRAM(s) Oral every 6 hours  guaiFENesin  milliGRAM(s) Oral every 12 hours  lidocaine   Patch 1 Patch Transdermal daily    MEDICATIONS  (PRN):  acetaminophen   Tablet. 650 milliGRAM(s) Oral every 6 hours PRN Moderate Pain (4 - 6)  benzonatate 100 milliGRAM(s) Oral every 8 hours PRN Cough      RADIOLOGY & ADDITIONAL TESTS:
Pt seen and examined 1/6/18  Breathing feels about the same    afebrile 70s-80s  115/69   18   92-94% on NC    General: WDWN resting comfortably in bed; NAD  Head: NC/AT  Eyes: EOMI  Neck: supple; no JVD   Respiratory: Decreased breath sounds bilaterally.   Cardiac: IRRR, no murmurs  Gastrointestinal: soft, NT/ND; no rebound or guarding; +BSx4  Back: spine midline, no bony tenderness or step-offs; no CVAT B/L  Extremities: no LE edema b/l  Neuro: no focal deficits    labs reviewed
[facility] Cardiology Progress Note  _______________________________    Pt. seen and examined. No new cardiac-related complaints.    Telemetry -AF 80's up to 120's overnight.    T(C): 36.8 (01-09-18 @ 05:21), Max: 36.8 (01-08-18 @ 11:59)  HR: 91 (01-09-18 @ 05:21) (91 - 116)  BP: 118/71 (01-09-18 @ 05:21) (118/71 - 157/79)  RR: 20 (01-09-18 @ 05:21) (19 - 20)  SpO2: 97% (01-09-18 @ 05:21) (94% - 97%)  I&O's Summary    08 Jan 2018 07:01  -  09 Jan 2018 07:00  --------------------------------------------------------  IN: 660 mL / OUT: 900 mL / NET: -240 mL    09 Jan 2018 07:01  -  09 Jan 2018 11:33  --------------------------------------------------------  IN: 420 mL / OUT: 200 mL / NET: 220 mL        PHYSICAL EXAM:  GENERAL: Elderly F, sleeping but arousable.  NECK: Supple, No JVD, No carotid bruit.  CHEST/LUNG: scattered wheeze/rhonchi.  HEART: S1 S2 normal, irreg. irreg rhythm.,  No murmurs, rubs, or gallops  ABDOMEN: Soft, Nontender, Nondistended; Bowel sounds present  EXTREMITIES:  No LE edema.      LABS:                        10.8   7.93  )-----------( 226      ( 08 Jan 2018 08:59 )             35.9     01-09    138  |  104  |  26<H>  ----------------------------<  103<H>  4.9   |  28  |  0.66    Ca    9.7      09 Jan 2018 07:57        CARDIAC MARKERS ( 02 Jan 2018 19:44 )  x     / 0.46 ng/mL / 56 U/L / x     / 4.7 ng/mL  CARDIAC MARKERS ( 02 Jan 2018 11:45 )  x     / 0.48 ng/mL / 62 U/L / x     / 3.4 ng/mL          MEDICATIONS  (STANDING):  ALBUTerol/ipratropium for Nebulization 3 milliLiter(s) Nebulizer every 6 hours  apixaban 2.5 milliGRAM(s) Oral every 12 hours  ascorbic acid 500 milliGRAM(s) Oral daily  atorvastatin 40 milliGRAM(s) Oral at bedtime  cyanocobalamin 1000 MICROGram(s) Oral daily  diltiazem    milliGRAM(s) Oral daily  furosemide   Injectable 20 milliGRAM(s) IV Push daily  guaiFENesin  milliGRAM(s) Oral every 12 hours  lidocaine   Patch 1 Patch Transdermal daily    MEDICATIONS  (PRN):  acetaminophen   Tablet. 650 milliGRAM(s) Oral every 6 hours PRN Moderate Pain (4 - 6)  benzonatate 100 milliGRAM(s) Oral every 8 hours PRN Cough      RADIOLOGY & ADDITIONAL TESTS:
[facility] Cardiology Progress Note  _______________________________    Pt. seen and examined. Chart reviewed.    Telemetry - afib to sinus rhythm 55-70's.    T(C): 36.9 (01-04-18 @ 05:43), Max: 36.9 (01-04-18 @ 02:00)  HR: 78 (01-04-18 @ 09:18) (65 - 118)  BP: 155/63 (01-04-18 @ 09:18) (111/66 - 173/67)  RR: 16 (01-04-18 @ 05:43) (16 - 17)  SpO2: 95% (01-04-18 @ 05:43) (92% - 97%)  I&O's Summary    03 Jan 2018 07:01  -  04 Jan 2018 07:00  --------------------------------------------------------  IN: 300 mL / OUT: 251 mL / NET: 49 mL    04 Jan 2018 07:01  -  04 Jan 2018 13:02  --------------------------------------------------------  IN: 360 mL / OUT: 250 mL / NET: 110 mL        PHYSICAL EXAM:  GENERAL: Elderly F, alert.  NECK: Supple, No JVD, No carotid bruit.  CHEST/LUNG: scattered wheeze/rhonchi.  HEART: S1 S2 normal, irreg. irreg rhythm.,  No murmurs, rubs, or gallops  ABDOMEN: Soft, Nontender, Nondistended; Bowel sounds present  EXTREMITIES:  No LE edema.      LABS:                        11.7   6.55  )-----------( 131      ( 04 Jan 2018 07:49 )             38.5     01-04    146<H>  |  109<H>  |  35<H>  ----------------------------<  152<H>  4.6   |  27  |  0.79    Ca    10.3      04 Jan 2018 07:49  Phos  2.1     01-04  Mg     2.0     01-04      PT/INR - ( 04 Jan 2018 07:49 )   PT: 11.5 sec;   INR: 1.02 ratio         PTT - ( 04 Jan 2018 07:49 )  PTT:43.7 sec  CARDIAC MARKERS ( 02 Jan 2018 19:44 )  x     / 0.46 ng/mL / 56 U/L / x     / 4.7 ng/mL  CARDIAC MARKERS ( 02 Jan 2018 11:45 )  x     / 0.48 ng/mL / 62 U/L / x     / 3.4 ng/mL          MEDICATIONS  (STANDING):  ALBUTerol/ipratropium for Nebulization 3 milliLiter(s) Nebulizer every 6 hours  apixaban 2.5 milliGRAM(s) Oral every 12 hours  ascorbic acid 500 milliGRAM(s) Oral daily  atorvastatin 40 milliGRAM(s) Oral at bedtime  cyanocobalamin 1000 MICROGram(s) Oral daily  diltiazem    Tablet 60 milliGRAM(s) Oral every 6 hours  guaiFENesin  milliGRAM(s) Oral every 12 hours  lidocaine   Patch 1 Patch Transdermal daily    MEDICATIONS  (PRN):  acetaminophen   Tablet. 650 milliGRAM(s) Oral every 6 hours PRN Moderate Pain (4 - 6)  benzonatate 100 milliGRAM(s) Oral every 8 hours PRN Cough      RADIOLOGY & ADDITIONAL TESTS:
Was in NSR throughout most of night, but converted back to afib after 4 AM    Vital Signs Last 24 Hrs  T(C): 36.9 (04 Jan 2018 05:43), Max: 36.9 (04 Jan 2018 02:00)  T(F): 98.4 (04 Jan 2018 05:43), Max: 98.4 (04 Jan 2018 02:00)  HR: 78 (04 Jan 2018 09:18) (65 - 118)  BP: 155/63 (04 Jan 2018 09:18) (111/66 - 173/67)  BP(mean): --  RR: 16 (04 Jan 2018 05:43) (16 - 17)  SpO2: 95% (04 Jan 2018 05:43) (92% - 97%)    General: WDWN resting comfortably in bed; NAD  	Head: NC/AT  	Eyes: PERRL, EOMI, anicteric sclera  	ENT: no nasal discharge; uvula midline, no oropharyngeal erythema or exudates; MMM  	Neck: supple; no JVD or thyromegaly  	Respiratory: Decreased breath sounds bilaterally. + Pain on palpation over right anterior chest  	Cardiac: IRRR, no murmurs  	Gastrointestinal: soft, NT/ND; no rebound or guarding; +BSx4  	Back: spine midline, no bony tenderness or step-offs; no CVAT B/L  Extremities: WWP, no clubbing or cyanosis; no peripheral edema    LABS:                        11.7   6.55  )-----------( 131      ( 04 Jan 2018 07:49 )             38.5     01-04    146<H>  |  109<H>  |  35<H>  ----------------------------<  152<H>  4.6   |  27  |  0.79    Ca    10.3      04 Jan 2018 07:49  Phos  2.1     01-04  Mg     2.0     01-04    TPro  7.0  /  Alb  3.4  /  TBili  0.7  /  DBili  x   /  AST  31  /  ALT  25  /  AlkPhos  80  01-02    PT/INR - ( 04 Jan 2018 07:49 )   PT: 11.5 sec;   INR: 1.02 ratio         PTT - ( 04 Jan 2018 07:49 )  PTT:43.7 sec  CAPILLARY BLOOD GLUCOSE        CARDIAC MARKERS ( 02 Jan 2018 19:44 )  x     / 0.46 ng/mL / 56 U/L / x     / 4.7 ng/mL  CARDIAC MARKERS ( 02 Jan 2018 11:45 )  x     / 0.48 ng/mL / 62 U/L / x     / 3.4 ng/mL
(+)cough, phlegm  no CP, SOB or palpitations    Vital Signs Last 24 Hrs  T(C): 36.9 (03 Jan 2018 08:19), Max: 37.2 (02 Jan 2018 19:58)  T(F): 98.5 (03 Jan 2018 08:19), Max: 98.9 (02 Jan 2018 19:58)  HR: 76 (03 Jan 2018 08:28) (70 - 96)  BP: 167/63 (03 Jan 2018 08:28) (127/90 - 167/63)  BP(mean): --  RR: 18 (03 Jan 2018 08:28) (18 - 22)  SpO2: 97% (03 Jan 2018 08:28) (95% - 98%)    Constitutional: WDWN resting comfortably in bed; NAD  	Head: NC/AT  	Eyes: PERRL, EOMI, anicteric sclera  	ENT: no nasal discharge; uvula midline, no oropharyngeal erythema or exudates; MMM  	Neck: supple; no JVD or thyromegaly  	Respiratory: Decreased breath sounds bilaterally. + Pain on palpation over right anterior chest  	Cardiac: IRRR, no murmurs  	Gastrointestinal: soft, NT/ND; no rebound or guarding; +BSx4  	Back: spine midline, no bony tenderness or step-offs; no CVAT B/L  Extremities: WWP, no clubbing or cyanosis; no peripheral edema    LABS:                        12.3   7.29  )-----------( 125      ( 03 Jan 2018 07:21 )             40.7     01-03    146<H>  |  109<H>  |  35<H>  ----------------------------<  103<H>  4.5   |  24  |  0.79    Ca    10.4      03 Jan 2018 07:12  Mg     1.9     01-03    TPro  7.0  /  Alb  3.4  /  TBili  0.7  /  DBili  x   /  AST  31  /  ALT  25  /  AlkPhos  80  01-02    PT/INR - ( 02 Jan 2018 11:47 )   PT: 10.2 sec;   INR: 0.94 ratio         PTT - ( 02 Jan 2018 11:47 )  PTT:34.1 sec  CAPILLARY BLOOD GLUCOSE        CARDIAC MARKERS ( 02 Jan 2018 19:44 )  x     / 0.46 ng/mL / 56 U/L / x     / 4.7 ng/mL  CARDIAC MARKERS ( 02 Jan 2018 11:45 )  x     / 0.48 ng/mL / 62 U/L / x     / 3.4 ng/mL

## 2018-01-16 ENCOUNTER — FORM ENCOUNTER (OUTPATIENT)
Age: 83
End: 2018-01-16

## 2018-01-17 ENCOUNTER — OUTPATIENT (OUTPATIENT)
Dept: OUTPATIENT SERVICES | Facility: HOSPITAL | Age: 83
LOS: 1 days | End: 2018-01-17
Payer: MEDICARE

## 2018-01-17 DIAGNOSIS — Z86.011 PERSONAL HISTORY OF BENIGN NEOPLASM OF THE BRAIN: Chronic | ICD-10-CM

## 2018-01-17 DIAGNOSIS — I82.409 ACUTE EMBOLISM AND THROMBOSIS OF UNSPECIFIED DEEP VEINS OF UNSPECIFIED LOWER EXTREMITY: ICD-10-CM

## 2018-01-17 PROCEDURE — 93971 EXTREMITY STUDY: CPT | Mod: 26

## 2018-01-17 PROCEDURE — 93971 EXTREMITY STUDY: CPT

## 2018-02-06 ENCOUNTER — INPATIENT (INPATIENT)
Facility: HOSPITAL | Age: 83
LOS: 3 days | DRG: 291 | End: 2018-02-10
Attending: INTERNAL MEDICINE | Admitting: INTERNAL MEDICINE
Payer: MEDICARE

## 2018-02-06 VITALS
TEMPERATURE: 98 F | OXYGEN SATURATION: 86 % | HEART RATE: 72 BPM | SYSTOLIC BLOOD PRESSURE: 94 MMHG | RESPIRATION RATE: 22 BRPM | DIASTOLIC BLOOD PRESSURE: 54 MMHG

## 2018-02-06 DIAGNOSIS — Z86.011 PERSONAL HISTORY OF BENIGN NEOPLASM OF THE BRAIN: Chronic | ICD-10-CM

## 2018-02-06 DIAGNOSIS — I50.1 LEFT VENTRICULAR FAILURE, UNSPECIFIED: ICD-10-CM

## 2018-02-06 LAB
ALBUMIN SERPL ELPH-MCNC: 2.7 G/DL — LOW (ref 3.3–5)
ALBUMIN SERPL ELPH-MCNC: 2.8 G/DL — LOW (ref 3.3–5)
ALBUMIN SERPL ELPH-MCNC: 3.3 G/DL — SIGNIFICANT CHANGE UP (ref 3.3–5)
ALP SERPL-CCNC: 121 U/L — HIGH (ref 40–120)
ALP SERPL-CCNC: 126 U/L — HIGH (ref 40–120)
ALP SERPL-CCNC: 128 U/L — HIGH (ref 40–120)
ALT FLD-CCNC: 14 U/L RC — SIGNIFICANT CHANGE UP (ref 10–45)
ALT FLD-CCNC: 14 U/L RC — SIGNIFICANT CHANGE UP (ref 10–45)
ALT FLD-CCNC: 22 U/L RC — SIGNIFICANT CHANGE UP (ref 10–45)
ANION GAP SERPL CALC-SCNC: 11 MMOL/L — SIGNIFICANT CHANGE UP (ref 5–17)
ANION GAP SERPL CALC-SCNC: 7 MMOL/L — SIGNIFICANT CHANGE UP (ref 5–17)
ANION GAP SERPL CALC-SCNC: 7 MMOL/L — SIGNIFICANT CHANGE UP (ref 5–17)
ANISOCYTOSIS BLD QL: SLIGHT — SIGNIFICANT CHANGE UP
APPEARANCE UR: CLEAR — SIGNIFICANT CHANGE UP
APTT BLD: 25.9 SEC — LOW (ref 27.5–37.4)
AST SERPL-CCNC: 14 U/L — SIGNIFICANT CHANGE UP (ref 10–40)
AST SERPL-CCNC: 37 U/L — SIGNIFICANT CHANGE UP (ref 10–40)
AST SERPL-CCNC: 75 U/L — HIGH (ref 10–40)
BACTERIA # UR AUTO: ABNORMAL /HPF
BASE EXCESS BLDA CALC-SCNC: 1.4 MMOL/L — SIGNIFICANT CHANGE UP (ref -2–2)
BASE EXCESS BLDA CALC-SCNC: 1.7 MMOL/L — SIGNIFICANT CHANGE UP (ref -2–2)
BASOPHILS # BLD AUTO: 0 K/UL — SIGNIFICANT CHANGE UP (ref 0–0.2)
BASOPHILS NFR BLD AUTO: 0.1 % — SIGNIFICANT CHANGE UP (ref 0–2)
BILIRUB SERPL-MCNC: 0.3 MG/DL — SIGNIFICANT CHANGE UP (ref 0.2–1.2)
BILIRUB SERPL-MCNC: 0.4 MG/DL — SIGNIFICANT CHANGE UP (ref 0.2–1.2)
BILIRUB SERPL-MCNC: 0.4 MG/DL — SIGNIFICANT CHANGE UP (ref 0.2–1.2)
BILIRUB UR-MCNC: NEGATIVE — SIGNIFICANT CHANGE UP
BUN SERPL-MCNC: 50 MG/DL — HIGH (ref 7–23)
BUN SERPL-MCNC: 51 MG/DL — HIGH (ref 7–23)
BUN SERPL-MCNC: 53 MG/DL — HIGH (ref 7–23)
CALCIUM SERPL-MCNC: 10 MG/DL — SIGNIFICANT CHANGE UP (ref 8.4–10.5)
CALCIUM SERPL-MCNC: 9.5 MG/DL — SIGNIFICANT CHANGE UP (ref 8.4–10.5)
CALCIUM SERPL-MCNC: 9.7 MG/DL — SIGNIFICANT CHANGE UP (ref 8.4–10.5)
CHLORIDE SERPL-SCNC: 90 MMOL/L — LOW (ref 96–108)
CHLORIDE SERPL-SCNC: 93 MMOL/L — LOW (ref 96–108)
CHLORIDE SERPL-SCNC: 94 MMOL/L — LOW (ref 96–108)
CK MB CFR SERPL CALC: 3.6 NG/ML — SIGNIFICANT CHANGE UP (ref 0–3.8)
CK MB CFR SERPL CALC: 3.7 NG/ML — SIGNIFICANT CHANGE UP (ref 0–3.8)
CK SERPL-CCNC: 28 U/L — SIGNIFICANT CHANGE UP (ref 25–170)
CO2 BLDA-SCNC: 31 MMOL/L — HIGH (ref 22–30)
CO2 BLDA-SCNC: 31 MMOL/L — HIGH (ref 22–30)
CO2 SERPL-SCNC: 23 MMOL/L — SIGNIFICANT CHANGE UP (ref 22–31)
CO2 SERPL-SCNC: 29 MMOL/L — SIGNIFICANT CHANGE UP (ref 22–31)
CO2 SERPL-SCNC: 29 MMOL/L — SIGNIFICANT CHANGE UP (ref 22–31)
COLOR SPEC: YELLOW — SIGNIFICANT CHANGE UP
CREAT SERPL-MCNC: 1.5 MG/DL — HIGH (ref 0.5–1.3)
CREAT SERPL-MCNC: 1.53 MG/DL — HIGH (ref 0.5–1.3)
CREAT SERPL-MCNC: 1.58 MG/DL — HIGH (ref 0.5–1.3)
DIFF PNL FLD: NEGATIVE — SIGNIFICANT CHANGE UP
DIGOXIN SERPL-MCNC: <0.4 NG/ML — LOW (ref 0.8–2)
ELLIPTOCYTES BLD QL SMEAR: SLIGHT — SIGNIFICANT CHANGE UP
EOSINOPHIL # BLD AUTO: 0 K/UL — SIGNIFICANT CHANGE UP (ref 0–0.5)
EOSINOPHIL NFR BLD AUTO: 0.4 % — SIGNIFICANT CHANGE UP (ref 0–6)
EPI CELLS # UR: SIGNIFICANT CHANGE UP /HPF
GAS PNL BLDA: SIGNIFICANT CHANGE UP
GAS PNL BLDA: SIGNIFICANT CHANGE UP
GAS PNL BLDV: SIGNIFICANT CHANGE UP
GLUCOSE SERPL-MCNC: 103 MG/DL — HIGH (ref 70–99)
GLUCOSE SERPL-MCNC: 109 MG/DL — HIGH (ref 70–99)
GLUCOSE SERPL-MCNC: 115 MG/DL — HIGH (ref 70–99)
GLUCOSE UR QL: NEGATIVE — SIGNIFICANT CHANGE UP
HCO3 BLDA-SCNC: 29 MMOL/L — SIGNIFICANT CHANGE UP (ref 21–29)
HCO3 BLDA-SCNC: 29 MMOL/L — SIGNIFICANT CHANGE UP (ref 21–29)
HCT VFR BLD CALC: 39 % — SIGNIFICANT CHANGE UP (ref 34.5–45)
HGB BLD-MCNC: 12.5 G/DL — SIGNIFICANT CHANGE UP (ref 11.5–15.5)
HOROWITZ INDEX BLDA+IHG-RTO: 40 — SIGNIFICANT CHANGE UP
HOROWITZ INDEX BLDA+IHG-RTO: 40 — SIGNIFICANT CHANGE UP
HYALINE CASTS # UR AUTO: ABNORMAL
INR BLD: 1.33 RATIO — HIGH (ref 0.88–1.16)
KETONES UR-MCNC: NEGATIVE — SIGNIFICANT CHANGE UP
LEUKOCYTE ESTERASE UR-ACNC: NEGATIVE — SIGNIFICANT CHANGE UP
LYMPHOCYTES # BLD AUTO: 0.7 K/UL — LOW (ref 1–3.3)
LYMPHOCYTES # BLD AUTO: 8.8 % — LOW (ref 13–44)
MACROCYTES BLD QL: SLIGHT — SIGNIFICANT CHANGE UP
MAGNESIUM SERPL-MCNC: 2.5 MG/DL — SIGNIFICANT CHANGE UP (ref 1.6–2.6)
MAGNESIUM SERPL-MCNC: 2.5 MG/DL — SIGNIFICANT CHANGE UP (ref 1.6–2.6)
MCHC RBC-ENTMCNC: 32.1 GM/DL — SIGNIFICANT CHANGE UP (ref 32–36)
MCHC RBC-ENTMCNC: 32.4 PG — SIGNIFICANT CHANGE UP (ref 27–34)
MCV RBC AUTO: 101 FL — HIGH (ref 80–100)
MICROCYTES BLD QL: SLIGHT — SIGNIFICANT CHANGE UP
MONOCYTES # BLD AUTO: 0.7 K/UL — SIGNIFICANT CHANGE UP (ref 0–0.9)
MONOCYTES NFR BLD AUTO: 8.2 % — SIGNIFICANT CHANGE UP (ref 2–14)
NEUTROPHILS # BLD AUTO: 6.7 K/UL — SIGNIFICANT CHANGE UP (ref 1.8–7.4)
NEUTROPHILS NFR BLD AUTO: 82.5 % — HIGH (ref 43–77)
NITRITE UR-MCNC: NEGATIVE — SIGNIFICANT CHANGE UP
NT-PROBNP SERPL-SCNC: 3468 PG/ML — HIGH (ref 0–300)
PCO2 BLDA: 62 MMHG — HIGH (ref 32–46)
PCO2 BLDA: 63 MMHG — HIGH (ref 32–46)
PH BLDA: 7.29 — LOW (ref 7.35–7.45)
PH BLDA: 7.29 — LOW (ref 7.35–7.45)
PH UR: 5.5 — SIGNIFICANT CHANGE UP (ref 5–8)
PHOSPHATE SERPL-MCNC: 4.2 MG/DL — SIGNIFICANT CHANGE UP (ref 2.5–4.5)
PHOSPHATE SERPL-MCNC: 4.5 MG/DL — SIGNIFICANT CHANGE UP (ref 2.5–4.5)
PLAT MORPH BLD: NORMAL — SIGNIFICANT CHANGE UP
PLATELET # BLD AUTO: 250 K/UL — SIGNIFICANT CHANGE UP (ref 150–400)
PO2 BLDA: 90 MMHG — SIGNIFICANT CHANGE UP (ref 74–108)
PO2 BLDA: 92 MMHG — SIGNIFICANT CHANGE UP (ref 74–108)
POIKILOCYTOSIS BLD QL AUTO: SLIGHT — SIGNIFICANT CHANGE UP
POLYCHROMASIA BLD QL SMEAR: SLIGHT — SIGNIFICANT CHANGE UP
POTASSIUM SERPL-MCNC: 5.4 MMOL/L — HIGH (ref 3.5–5.3)
POTASSIUM SERPL-MCNC: 6.2 MMOL/L — CRITICAL HIGH (ref 3.5–5.3)
POTASSIUM SERPL-MCNC: 8.9 MMOL/L — CRITICAL HIGH (ref 3.5–5.3)
POTASSIUM SERPL-SCNC: 5.4 MMOL/L — HIGH (ref 3.5–5.3)
POTASSIUM SERPL-SCNC: 6.2 MMOL/L — CRITICAL HIGH (ref 3.5–5.3)
POTASSIUM SERPL-SCNC: 8.9 MMOL/L — CRITICAL HIGH (ref 3.5–5.3)
PROT SERPL-MCNC: 5.6 G/DL — LOW (ref 6–8.3)
PROT SERPL-MCNC: 5.6 G/DL — LOW (ref 6–8.3)
PROT SERPL-MCNC: 6.9 G/DL — SIGNIFICANT CHANGE UP (ref 6–8.3)
PROT UR-MCNC: SIGNIFICANT CHANGE UP
PROTHROM AB SERPL-ACNC: 14.6 SEC — HIGH (ref 9.8–12.7)
RAPID RVP RESULT: SIGNIFICANT CHANGE UP
RBC # BLD: 3.87 M/UL — SIGNIFICANT CHANGE UP (ref 3.8–5.2)
RBC # FLD: 16 % — HIGH (ref 10.3–14.5)
RBC BLD AUTO: ABNORMAL
SAO2 % BLDA: 96 % — SIGNIFICANT CHANGE UP (ref 92–96)
SAO2 % BLDA: 96 % — SIGNIFICANT CHANGE UP (ref 92–96)
SODIUM SERPL-SCNC: 126 MMOL/L — LOW (ref 135–145)
SODIUM SERPL-SCNC: 127 MMOL/L — LOW (ref 135–145)
SODIUM SERPL-SCNC: 130 MMOL/L — LOW (ref 135–145)
SP GR SPEC: 1.01 — SIGNIFICANT CHANGE UP (ref 1.01–1.02)
TROPONIN T SERPL-MCNC: <0.01 NG/ML — SIGNIFICANT CHANGE UP (ref 0–0.06)
TROPONIN T SERPL-MCNC: <0.01 NG/ML — SIGNIFICANT CHANGE UP (ref 0–0.06)
UROBILINOGEN FLD QL: NEGATIVE — SIGNIFICANT CHANGE UP
WBC # BLD: 8.1 K/UL — SIGNIFICANT CHANGE UP (ref 3.8–10.5)
WBC # FLD AUTO: 8.1 K/UL — SIGNIFICANT CHANGE UP (ref 3.8–10.5)
WBC UR QL: SIGNIFICANT CHANGE UP /HPF (ref 0–5)

## 2018-02-06 PROCEDURE — 71045 X-RAY EXAM CHEST 1 VIEW: CPT | Mod: 26

## 2018-02-06 PROCEDURE — 99291 CRITICAL CARE FIRST HOUR: CPT | Mod: GC

## 2018-02-06 PROCEDURE — 93010 ELECTROCARDIOGRAM REPORT: CPT

## 2018-02-06 RX ORDER — HEPARIN SODIUM 5000 [USP'U]/ML
4000 INJECTION INTRAVENOUS; SUBCUTANEOUS EVERY 6 HOURS
Qty: 0 | Refills: 0 | Status: DISCONTINUED | OUTPATIENT
Start: 2018-02-06 | End: 2018-02-08

## 2018-02-06 RX ORDER — HEPARIN SODIUM 5000 [USP'U]/ML
INJECTION INTRAVENOUS; SUBCUTANEOUS
Qty: 25000 | Refills: 0 | Status: DISCONTINUED | OUTPATIENT
Start: 2018-02-06 | End: 2018-02-07

## 2018-02-06 RX ORDER — HEPARIN SODIUM 5000 [USP'U]/ML
2000 INJECTION INTRAVENOUS; SUBCUTANEOUS EVERY 6 HOURS
Qty: 0 | Refills: 0 | Status: DISCONTINUED | OUTPATIENT
Start: 2018-02-06 | End: 2018-02-07

## 2018-02-06 RX ORDER — HEPARIN SODIUM 5000 [USP'U]/ML
5000 INJECTION INTRAVENOUS; SUBCUTANEOUS EVERY 12 HOURS
Qty: 0 | Refills: 0 | Status: DISCONTINUED | OUTPATIENT
Start: 2018-02-06 | End: 2018-02-06

## 2018-02-06 RX ORDER — SODIUM CHLORIDE 9 MG/ML
500 INJECTION INTRAMUSCULAR; INTRAVENOUS; SUBCUTANEOUS ONCE
Qty: 0 | Refills: 0 | Status: COMPLETED | OUTPATIENT
Start: 2018-02-06 | End: 2018-02-06

## 2018-02-06 RX ORDER — METOPROLOL TARTRATE 50 MG
1 TABLET ORAL
Qty: 0 | Refills: 0 | COMMUNITY

## 2018-02-06 RX ORDER — FUROSEMIDE 40 MG
40 TABLET ORAL
Qty: 0 | Refills: 0 | Status: DISCONTINUED | OUTPATIENT
Start: 2018-02-06 | End: 2018-02-07

## 2018-02-06 RX ADMIN — SODIUM CHLORIDE 500 MILLILITER(S): 9 INJECTION INTRAMUSCULAR; INTRAVENOUS; SUBCUTANEOUS at 14:30

## 2018-02-06 RX ADMIN — HEPARIN SODIUM 1000 UNIT(S)/HR: 5000 INJECTION INTRAVENOUS; SUBCUTANEOUS at 23:15

## 2018-02-06 RX ADMIN — HEPARIN SODIUM 5000 UNIT(S): 5000 INJECTION INTRAVENOUS; SUBCUTANEOUS at 21:13

## 2018-02-06 RX ADMIN — Medication 40 MILLIGRAM(S): at 22:23

## 2018-02-06 NOTE — ED PROVIDER NOTE - OBJECTIVE STATEMENT
89 yo female with PMH of afib, HTN on diltiazem and metoprolol, HLD, h/o meningioma, presents to the ED for SOB and difficulty breathing. Patient seen in hospital for flu about one month ago, afterwards had her BP medications increased. Since then, increasing SOB. Pt denies CP, LOC, palpitations, fever, chills, n/v, rash, diarrhea, productive cough. At her neuro baseline per her daughter at bedside but SOB and DOMÍNGUEZ. Daughter is patient's healthcare proxy.

## 2018-02-06 NOTE — H&P ADULT - HISTORY OF PRESENT ILLNESS
This is a 89 y/o female with h/o HTN, HLD, BPPV, left hip fracture s/p IM nailing (1997), AFib (on eliquis), h/o meningioma, hx of fall who was brought to cardiologist today for appointment, noted in office to be dyspneic, SOB, with increased lethargy and instructed to report to ED. Pt was brought to ED by daughter who also notes increased LE edema. Of note, pt hospitalized for influenza approx 1 month ago and had BP medications increased. AMS last admission attributed to robitussin use. Pt denies CP, LOC, palpitations, fever, chills, n/v, rash, diarrhea, productive cough. Pt placed on bipap in ED and given 500cc fluid for hypotension. Pt also noted to be bradycardic. Pt admitted to CCU2 for further management/workup.    Daughter is patient's healthcare proxy, pt made DNR/DNI. Pressors are ok with daughter, no invasive measures.

## 2018-02-06 NOTE — ED ADULT NURSE NOTE - OBJECTIVE STATEMENT
pt bib daughter for c/o worsening sob.  as per daughter, pt had influenza b last week was d/c'd to rehab then assisted living but continues to decompensate.  sob with mild respiratory distress noted at this time but pt denies any syncope, cp, dizziness, or numbness/tingling at this time. breath sounds are diminished to b/l lower lobes.  +4 pitting edema noted to RUE and BLE.  affected extremities are cool to touch with very weak pulses upon palpation.  pale skin color noted.  pt is hypotensive with sbp=90's and bradycardic in afib.  bipap 10/5 fio2=40% in place per md's orders.  end of life wishes have been discussed with pt and hcp(daughter).  at this time, pt does not want any intubation but no official documents in place.  pt resting in bed with cardiac monitor in place in afib with lowest hr=37; pt remains asymptomatic.  daughter at bedside and comfort provided.  will continue to monitor.

## 2018-02-06 NOTE — H&P ADULT - NSHPREVIEWOFSYSTEMS_GEN_ALL_CORE
REVIEW OF SYSTEMS:    CONSTITUTIONAL: + lethargy. No weakness, fevers or chills  EYES/ENT: No visual changes;  No vertigo or throat pain   NECK: No pain or stiffness  RESPIRATORY: +dyspnea, SOB. No cough, wheezing, hemoptysis.  CARDIOVASCULAR: No chest pain or palpitations  GASTROINTESTINAL: No abdominal or epigastric pain. No nausea, vomiting, or hematemesis; No diarrhea or constipation. No melena or hematochezia.  GENITOURINARY: No dysuria, frequency or hematuria  NEUROLOGICAL: No numbness or weakness  SKIN: No itching, rashes

## 2018-02-06 NOTE — ED PROVIDER NOTE - ATTENDING CONTRIBUTION TO CARE
Attending MD Cosby:  I personally have seen and examined this patient.  Resident note reviewed and agree on plan of care and except where noted.  See MDM for details.

## 2018-02-06 NOTE — ED PROVIDER NOTE - CRITICAL CARE PROVIDED
documentation/interpretation of diagnostic studies/consult w/ pt's family directly relating to pts condition/consultation with other physicians/conducted a detailed discussion of DNR status/additional history taking/direct patient care (not related to procedure)

## 2018-02-06 NOTE — H&P ADULT - ASSESSMENT
89 y/o female with h/o HTN, HLD, AFib (on eliquis at home) who was brought to the ED by daughter for increasing lethargy and SOB. Pt placed on BIPAP in ED and given 500cc fluid. EKG reveals slow AF with rate 50s without ischemic changes, CE negative, BNP 3400, CHERYL Cr 1.58, RVP negative. CXR shows bilateral lower lobe opacities with atelectasis. Pt transferred to CCU2 for management of ADHF/pulmonary edema.    Neuro:    CV:    Resp:  -on BIPAP sating 100%  -ABG showed PH 7.29 with CO2 62 on 10/5. Will increase IPAP to 12 and repeat gas  -  GI:    Renal:    ID:    PPX: 89 y/o female with h/o HTN, HLD, AFib (on eliquis at home) who was brought to the ED by daughter for increasing lethargy and SOB. Pt placed on BIPAP in ED and given 500cc fluid. EKG reveals slow AF with rate 50s without ischemic changes, CE negative, BNP 3400, CHERYL Cr 1.58, RVP negative. CXR shows bilateral lower lobe opacities with atelectasis. Pt transferred to CCU2 for management of ADHF/pulmonary edema.    Neuro:    #CV:  -Pt bradycardic in the 40s-50s (AF)   -family does not want pacing pads on and pt was made DNR/DNI. Pressors are ok should pt require BP support, but no invasive measures.  -On admission pts BP was 90s systolic with MAP 90s. Repeat BP now is 110s with MAP 70s so will attempt diuresis overnight as pressure can tolerate  -bipap will also aid with diuresis  -AFib was on eliquis/cardizem/metoprolol at home. Holding in the setting of bradycardia and holding eliquis for now while on bipap  -heparin gtt for AC [id number] is 4-5- CHF hx?  -TTE in AM    #Resp:  -on BIPAP sating 100%  -ABG showed PH 7.29 with CO2 62 on 10/5. Will increase IPAP to 12 and repeat gas  -RVP negative    #GI:  -NPO while on BIPAP   -no issues    #Renal:  -Cr on discharge from last admission was 0.77, now CHERYL with Cr 1.58  -Monitor for now, may improve with appropriate diuresis  -Consider renal consult if persisting   -K was initially hemolyzed and repeat was 5.4. Will repeat BMP overnight.    #ID:  -RVP was negative, no fevers, no leukocytosis, no signs of infection  -monitor off antibiotics    #PPX:  -heparin gtt 89 y/o female with h/o HTN, HLD, AFib (on eliquis at home) who was brought to the ED by daughter for increasing lethargy and SOB. Pt placed on BIPAP in ED and given 500cc fluid. EKG reveals slow AF with rate 50s without ischemic changes, CE negative, BNP 3400, CHERYL Cr 1.58, RVP negative. CXR shows bilateral lower lobe opacities with atelectasis. Pt transferred to CCU2 for management of ADHF/pulmonary edema.    Neuro:    #CV:  -Pt bradycardic in the 40s-50s (AF)   -family does not want pacing pads on and pt was made DNR/DNI. Pressors are ok should pt require BP support, but no invasive measures.  -On admission pts BP was 90s systolic with MAP 90s. Repeat BP now is 110s with MAP 70s so will attempt diuresis overnight as pressure can tolerate  -bipap will also aid with diuresis  -AFib was on eliquis/cardizem/metoprolol at home. Holding in the setting of bradycardia and holding eliquis for now while on bipap  -heparin gtt for AC [id number] is 4-5- CHF hx?  -TTE in AM  -CE x1 negative, will trend    #Resp:  -on BIPAP sating 100%  -ABG showed PH 7.29 with CO2 62 on 10/5. Will increase IPAP to 12 and repeat gas  -RVP negative    #GI:  -NPO while on BIPAP   -no issues    #Renal:  -Cr on discharge from last admission was 0.77, now CHERYL with Cr 1.58  -Monitor for now, may improve with appropriate diuresis  -Consider renal consult if persisting   -K was initially hemolyzed and repeat was 5.4. Will repeat BMP overnight.    #ID:  -RVP was negative, no fevers, no leukocytosis, no signs of infection  -monitor off antibiotics    #PPX:  -heparin gtt 89 y/o female with h/o HTN, HLD, AFib (on eliquis at home) who was brought to the ED by daughter for increasing lethargy and SOB. Pt placed on BIPAP in ED and given 500cc fluid. EKG reveals slow AF with rate 50s without ischemic changes, CE negative, BNP 3400, CHERYL Cr 1.58, RVP negative. CXR shows bilateral lower lobe opacities with atelectasis. Pt transferred to CCU2 for management of ADHF/pulmonary edema.    #Neuro:  -currently A&Ox3 and mentating more appropriately     #CV:  -Pt bradycardic in the 40s-50s (AF)   -family does not want pacing pads on and pt was made DNR/DNI. Pressors are ok should pt require BP support, but no invasive measures.  -On admission pts BP was 90s systolic with MAP 90s. Repeat BP now is 110s with MAP 70s so will attempt diuresis overnight as pressure can tolerate  -bipap will also aid with diuresis  -AFib was on eliquis/cardizem/metoprolol at home. Holding in the setting of bradycardia and holding eliquis for now while on bipap  -heparin gtt for AC [id number] is 4-5- CHF hx?  -TTE in AM  -CE x1 negative, will trend    #Resp:  -on BIPAP sating 100%  -ABG showed PH 7.29 with CO2 62 on 10/5. Will increase IPAP to 12 and repeat gas  -RVP negative    #GI:  -NPO while on BIPAP   -no issues    #Renal:  -Cr on discharge from last admission was 0.77, now CHERYL with Cr 1.58  -Monitor for now, may improve with appropriate diuresis  -Consider renal consult if persisting   -K was initially hemolyzed and repeat was 5.4. Will repeat BMP overnight.    #ID:  -RVP was negative, no fevers, no leukocytosis, no signs of infection  -monitor off antibiotics    #PPX:  -heparin gtt

## 2018-02-06 NOTE — H&P ADULT - NSHPPHYSICALEXAM_GEN_ALL_CORE
General: WN/WD   Neurology: A&Ox3, nonfocal  Respiratory: CTA B/L  CV: RRR, S1S2, no murmurs, rubs or gallops  Abdominal: Soft, NT, ND +BS, Last BM  Extremities: No edema, + peripheral pulses  Lines:   : Torres catheter in place General: WN/WD   Neurology: A&Ox3, nonfocal  Respiratory: Decreased breath sounds throughout lung fields, shallow respirations.  CV: Irregular rhythm, S1S2, no murmurs, rubs or gallops. Elevated JVD.  Abdominal: Soft, NT, ND +BS  Extremities: 2+ pitting LE edema bilaterally up to knees, + peripheral pulses   : Torres catheter in place

## 2018-02-06 NOTE — ED PROVIDER NOTE - MEDICAL DECISION MAKING DETAILS
EKG, Bipap, labs including cardiac enzymes, pro-bnp, cardiology consult, pacer pads; advanced directive discussion EKG, Bipap, labs including cardiac enzymes, pro-bnp, cardiology consult, pacer pads; advanced directive discussion    Attending MD Cosby: 89 yo female with PMH of afib, HTN on diltiazem and metoprolol, HLD, h/o meningioma, presents to the ED for SOB and difficulty breathing. Patient seen in hospital for flu about one month ago, afterwards had her BP medications increased. Since then, increasing SOB. Pt denies CP, LOC, palpitations, fever, chills, n/v, rash, diarrhea, productive cough. At her neuro baseline per her daughter at bedside but SOB and DOMÍNGUEZ. Daughter is patient's healthcare proxy.  On exam the patient in respiratory distress and BIPAP was initiated.   Cardiology consult at beside.  Feel patient went into CHF related to increase in meds CCB and B-blockers.  HR dipping into 30s will admit to CCU.

## 2018-02-06 NOTE — ED PROVIDER NOTE - CARE PLAN
Principal Discharge DX:	Pulmonary edema cardiac cause  Secondary Diagnosis:	Bradycardia  Secondary Diagnosis:	Hypotension

## 2018-02-07 DIAGNOSIS — I50.1 LEFT VENTRICULAR FAILURE, UNSPECIFIED: ICD-10-CM

## 2018-02-07 DIAGNOSIS — R00.1 BRADYCARDIA, UNSPECIFIED: ICD-10-CM

## 2018-02-07 LAB
ALBUMIN SERPL ELPH-MCNC: 2.6 G/DL — LOW (ref 3.3–5)
ALBUMIN SERPL ELPH-MCNC: 2.8 G/DL — LOW (ref 3.3–5)
ALP SERPL-CCNC: 120 U/L — SIGNIFICANT CHANGE UP (ref 40–120)
ALP SERPL-CCNC: 122 U/L — HIGH (ref 40–120)
ALT FLD-CCNC: 13 U/L RC — SIGNIFICANT CHANGE UP (ref 10–45)
ALT FLD-CCNC: 15 U/L RC — SIGNIFICANT CHANGE UP (ref 10–45)
ANION GAP SERPL CALC-SCNC: 11 MMOL/L — SIGNIFICANT CHANGE UP (ref 5–17)
ANION GAP SERPL CALC-SCNC: 9 MMOL/L — SIGNIFICANT CHANGE UP (ref 5–17)
APTT BLD: > 200 SEC (ref 27.5–37.4)
AST SERPL-CCNC: 12 U/L — SIGNIFICANT CHANGE UP (ref 10–40)
AST SERPL-CCNC: 15 U/L — SIGNIFICANT CHANGE UP (ref 10–40)
BASE EXCESS BLDA CALC-SCNC: 2.6 MMOL/L — HIGH (ref -2–2)
BILIRUB SERPL-MCNC: 0.4 MG/DL — SIGNIFICANT CHANGE UP (ref 0.2–1.2)
BILIRUB SERPL-MCNC: 0.4 MG/DL — SIGNIFICANT CHANGE UP (ref 0.2–1.2)
BLD GP AB SCN SERPL QL: NEGATIVE — SIGNIFICANT CHANGE UP
BUN SERPL-MCNC: 50 MG/DL — HIGH (ref 7–23)
BUN SERPL-MCNC: 54 MG/DL — HIGH (ref 7–23)
CALCIUM SERPL-MCNC: 9.6 MG/DL — SIGNIFICANT CHANGE UP (ref 8.4–10.5)
CALCIUM SERPL-MCNC: 9.8 MG/DL — SIGNIFICANT CHANGE UP (ref 8.4–10.5)
CHLORIDE SERPL-SCNC: 94 MMOL/L — LOW (ref 96–108)
CHLORIDE SERPL-SCNC: 94 MMOL/L — LOW (ref 96–108)
CK MB CFR SERPL CALC: 2.4 NG/ML — SIGNIFICANT CHANGE UP (ref 0–3.8)
CK MB CFR SERPL CALC: 2.7 NG/ML — SIGNIFICANT CHANGE UP (ref 0–3.8)
CK SERPL-CCNC: 19 U/L — LOW (ref 25–170)
CK SERPL-CCNC: 19 U/L — LOW (ref 25–170)
CO2 BLDA-SCNC: 32 MMOL/L — HIGH (ref 22–30)
CO2 SERPL-SCNC: 25 MMOL/L — SIGNIFICANT CHANGE UP (ref 22–31)
CO2 SERPL-SCNC: 29 MMOL/L — SIGNIFICANT CHANGE UP (ref 22–31)
CREAT SERPL-MCNC: 1.48 MG/DL — HIGH (ref 0.5–1.3)
CREAT SERPL-MCNC: 1.58 MG/DL — HIGH (ref 0.5–1.3)
CULTURE RESULTS: SIGNIFICANT CHANGE UP
GAS PNL BLDA: SIGNIFICANT CHANGE UP
GAS PNL BLDA: SIGNIFICANT CHANGE UP
GLUCOSE SERPL-MCNC: 106 MG/DL — HIGH (ref 70–99)
GLUCOSE SERPL-MCNC: 92 MG/DL — SIGNIFICANT CHANGE UP (ref 70–99)
HCO3 BLDA-SCNC: 30 MMOL/L — HIGH (ref 21–29)
HCT VFR BLD CALC: 36.8 % — SIGNIFICANT CHANGE UP (ref 34.5–45)
HGB BLD-MCNC: 12.1 G/DL — SIGNIFICANT CHANGE UP (ref 11.5–15.5)
HOROWITZ INDEX BLDA+IHG-RTO: 40 — SIGNIFICANT CHANGE UP
INR BLD: 1.39 RATIO — HIGH (ref 0.88–1.16)
MAGNESIUM SERPL-MCNC: 2.3 MG/DL — SIGNIFICANT CHANGE UP (ref 1.6–2.6)
MAGNESIUM SERPL-MCNC: 2.4 MG/DL — SIGNIFICANT CHANGE UP (ref 1.6–2.6)
MCHC RBC-ENTMCNC: 33 GM/DL — SIGNIFICANT CHANGE UP (ref 32–36)
MCHC RBC-ENTMCNC: 33.5 PG — SIGNIFICANT CHANGE UP (ref 27–34)
MCV RBC AUTO: 102 FL — HIGH (ref 80–100)
PCO2 BLDA: 62 MMHG — HIGH (ref 32–46)
PH BLDA: 7.3 — LOW (ref 7.35–7.45)
PHOSPHATE SERPL-MCNC: 4.2 MG/DL — SIGNIFICANT CHANGE UP (ref 2.5–4.5)
PHOSPHATE SERPL-MCNC: 4.4 MG/DL — SIGNIFICANT CHANGE UP (ref 2.5–4.5)
PLATELET # BLD AUTO: 184 K/UL — SIGNIFICANT CHANGE UP (ref 150–400)
PO2 BLDA: 95 MMHG — SIGNIFICANT CHANGE UP (ref 74–108)
POTASSIUM SERPL-MCNC: 4.7 MMOL/L — SIGNIFICANT CHANGE UP (ref 3.5–5.3)
POTASSIUM SERPL-MCNC: 5.2 MMOL/L — SIGNIFICANT CHANGE UP (ref 3.5–5.3)
POTASSIUM SERPL-SCNC: 4.7 MMOL/L — SIGNIFICANT CHANGE UP (ref 3.5–5.3)
POTASSIUM SERPL-SCNC: 5.2 MMOL/L — SIGNIFICANT CHANGE UP (ref 3.5–5.3)
PROT SERPL-MCNC: 5.5 G/DL — LOW (ref 6–8.3)
PROT SERPL-MCNC: 5.6 G/DL — LOW (ref 6–8.3)
PROTHROM AB SERPL-ACNC: 15.2 SEC — HIGH (ref 9.8–12.7)
RBC # BLD: 3.62 M/UL — LOW (ref 3.8–5.2)
RBC # FLD: 15.7 % — HIGH (ref 10.3–14.5)
RH IG SCN BLD-IMP: POSITIVE — SIGNIFICANT CHANGE UP
SAO2 % BLDA: 98 % — HIGH (ref 92–96)
SODIUM SERPL-SCNC: 130 MMOL/L — LOW (ref 135–145)
SODIUM SERPL-SCNC: 132 MMOL/L — LOW (ref 135–145)
SPECIMEN SOURCE: SIGNIFICANT CHANGE UP
TROPONIN T SERPL-MCNC: <0.01 NG/ML — SIGNIFICANT CHANGE UP (ref 0–0.06)
TROPONIN T SERPL-MCNC: <0.01 NG/ML — SIGNIFICANT CHANGE UP (ref 0–0.06)
WBC # BLD: 7.7 K/UL — SIGNIFICANT CHANGE UP (ref 3.8–10.5)
WBC # FLD AUTO: 7.7 K/UL — SIGNIFICANT CHANGE UP (ref 3.8–10.5)

## 2018-02-07 PROCEDURE — 99233 SBSQ HOSP IP/OBS HIGH 50: CPT | Mod: GC

## 2018-02-07 PROCEDURE — 93010 ELECTROCARDIOGRAM REPORT: CPT

## 2018-02-07 PROCEDURE — 93306 TTE W/DOPPLER COMPLETE: CPT | Mod: 26

## 2018-02-07 RX ORDER — ESMOLOL HCL 100MG/10ML
25 VIAL (ML) INTRAVENOUS
Qty: 2500 | Refills: 0 | Status: DISCONTINUED | OUTPATIENT
Start: 2018-02-07 | End: 2018-02-07

## 2018-02-07 RX ORDER — METOPROLOL TARTRATE 50 MG
12.5 TABLET ORAL ONCE
Qty: 0 | Refills: 0 | Status: COMPLETED | OUTPATIENT
Start: 2018-02-07 | End: 2018-02-07

## 2018-02-07 RX ORDER — METOPROLOL TARTRATE 50 MG
25 TABLET ORAL
Qty: 0 | Refills: 0 | Status: DISCONTINUED | OUTPATIENT
Start: 2018-02-07 | End: 2018-02-07

## 2018-02-07 RX ORDER — APIXABAN 2.5 MG/1
2.5 TABLET, FILM COATED ORAL EVERY 12 HOURS
Qty: 0 | Refills: 0 | Status: DISCONTINUED | OUTPATIENT
Start: 2018-02-07 | End: 2018-02-10

## 2018-02-07 RX ORDER — METOPROLOL TARTRATE 50 MG
25 TABLET ORAL
Qty: 0 | Refills: 0 | Status: DISCONTINUED | OUTPATIENT
Start: 2018-02-07 | End: 2018-02-08

## 2018-02-07 RX ORDER — FUROSEMIDE 40 MG
80 TABLET ORAL ONCE
Qty: 0 | Refills: 0 | Status: COMPLETED | OUTPATIENT
Start: 2018-02-07 | End: 2018-02-07

## 2018-02-07 RX ORDER — METOPROLOL TARTRATE 50 MG
2.5 TABLET ORAL ONCE
Qty: 0 | Refills: 0 | Status: COMPLETED | OUTPATIENT
Start: 2018-02-07 | End: 2018-02-07

## 2018-02-07 RX ADMIN — Medication 2.5 MILLIGRAM(S): at 23:22

## 2018-02-07 RX ADMIN — Medication 2.5 MILLIGRAM(S): at 09:08

## 2018-02-07 RX ADMIN — APIXABAN 2.5 MILLIGRAM(S): 2.5 TABLET, FILM COATED ORAL at 14:00

## 2018-02-07 RX ADMIN — Medication 25 MILLIGRAM(S): at 22:01

## 2018-02-07 RX ADMIN — Medication 12.5 MILLIGRAM(S): at 15:51

## 2018-02-07 RX ADMIN — Medication 2.5 MILLIGRAM(S): at 08:40

## 2018-02-07 RX ADMIN — Medication 12.5 MILLIGRAM(S): at 13:24

## 2018-02-07 RX ADMIN — HEPARIN SODIUM 0 UNIT(S)/HR: 5000 INJECTION INTRAVENOUS; SUBCUTANEOUS at 06:30

## 2018-02-07 RX ADMIN — Medication 80 MILLIGRAM(S): at 03:10

## 2018-02-07 RX ADMIN — Medication 8.01 MICROGRAM(S)/KG/MIN: at 12:38

## 2018-02-07 NOTE — PROGRESS NOTE ADULT - PROBLEM SELECTOR PLAN 1
Increase lasix 80 IV BID  BIPAP support  Monitor BUN/Cr, u/o and daily weight  TTE d/c lasix   BIPAP support  Monitor BUN/Cr, u/o and daily weight  TTE

## 2018-02-07 NOTE — CHART NOTE - NSCHARTNOTEFT_GEN_A_CORE
[facility]    Pt is well known to our office.  I sent her to the hospital yesterday for volume overload and hypotension.  Our team to follow when she is downgraded for CCU.    Liam Bae M.D., [facility]  [phone]

## 2018-02-07 NOTE — PROVIDER CONTACT NOTE (CRITICAL VALUE NOTIFICATION) - ACTION/TREATMENT ORDERED:
Redraw CMP. Continue to monitor
Heparin drip [id number] in 250ml at 10ml/hr,1000units/hr stopped/held for 1 hour from 06:30am to 07:30am then Heparin drip [id number] in 250ml to be decreased to 8ml/hr,800units/hr at 07:30am as per Heparin full anticoagulation nomogram and will recheck PT/PTT in 6 hours

## 2018-02-07 NOTE — PROVIDER CONTACT NOTE (CRITICAL VALUE NOTIFICATION) - RECOMMENDATIONS
Redraw CMP
Heparin drip [id number] in 250ml at 10ml/hr,1000units/hr stopped/held for 1 hour from 06:30am to 07:30am then Heparin drip [id number] in 250ml to be decreased to 8ml/hr,800units/hr at 07:30am as per Heparin full anticoagulation nomogram and will recheck PT/PTT in 6 hours

## 2018-02-07 NOTE — PROGRESS NOTE ADULT - SUBJECTIVE AND OBJECTIVE BOX
CCU2/PICU NOTE  Admission date: 2/6/18  Chief complaint/ Diagnosis: ADHF and Pulm edema   HPI: A 89 y/o F w/ h/o HTN, HLD, BPPV, left hip fracture s/p IM nailing (1997), AFib (on eliquis), h/o meningioma, hx of fall , recent admission for flu p/w SOB and bradycardia.  Pt made DNR/DNI. Pressors are ok with daughter, no invasive measures.    Interval history: on bipap  s/p lasix 40 IVP x 1 and 80 IVP x 1     REVIEW OF SYSTEMS  Denies CP, Palpitation, SOB, Dyspnea [ X ] All other systems negative    MEDICATIONS  (STANDING):  furosemide   Injectable 40 milliGRAM(s) IV Push two times a day  heparin  Infusion.  Unit(s)/Hr (10 mL/Hr) IV Continuous <Continuous>    MEDICATIONS  (PRN):  heparin  Injectable 4000 Unit(s) IV Push every 6 hours PRN For aPTT less than 40  heparin  Injectable 2000 Unit(s) IV Push every 6 hours PRN For aPTT between 40 - 57    Allergy: No Known Allergies    ICU Vital Signs Last 24 Hrs  T(C): 36.7 (Max: 36.9 )  HR: 92 (46 - 92)  BP: 107/66 (83/- - 124/38)  RR: 23  (16 - 25)  SpO2: 100% (86% - 100%) on bipap    I&O's Summary  IN: 70 mL / OUT: 500 mL / NET: -430 mL    PHYSICAL EXAM  Appearance: Normal, NAD  EYES: EOMI, PERRLA, conjunctiva and sclera clear  NECK: Supple, No JVD  CHEST/LUNG: Clear to auscultation bilaterally; No wheezing  HEART: Normal S1, S2. No murmurs, rubs, or gallops  ABDOMEN: + Bowel sounds, Soft, NT, ND   EXTREMITIES:  2+ Peripheral Pulses, No clubbing, cyanosis, or edema  NEUROLOGY: non-focal, aaox3  SKIN: No rashes or lesions    Interpretation of Telemetry:                   12.1   7.7   )-----------( 184                  36.8     130<132<127  |  94<L>  |  54<H>  ----------------------------<  92  4.7   |  25  |  1.48<1.58<1.53    Ca    9.6   Phos  4.2   Mg     2.4      TPro  5.5<L>  /  Alb  2.6<L>  /  TBili  0.4  /  DBili  x   /  AST  15  /  ALT  13  /  AlkPhos  120  02-07      CARDIAC MARKERS ( 07 Feb 2018 05:30 )  x     / <0.01 ng/mL / 19 U/L / x     / 2.4 ng/mL  CARDIAC MARKERS ( 07 Feb 2018 00:28 )  x     / <0.01 ng/mL / 19 U/L / x     / 2.7 ng/mL  CARDIAC MARKERS ( 06 Feb 2018 17:56 )  x     / <0.01 ng/mL / 28 U/L / x     / 3.7 ng/mL  CARDIAC MARKERS ( 06 Feb 2018 14:50 )  x     / <0.01 ng/mL / x     / x     / 3.6 ng/mL      ABG - ( 07 Feb 2018 05:20 )  pH: 7.30  /  pCO2: 62    /  pO2: 95    / HCO3: 30    / Base Excess: 2.6   /  SaO2: 98 CCU2/PICU NOTE  Admission date: 2/6/18  Chief complaint/ Diagnosis: ADHF and Pulm edema   HPI: A 89 y/o F w/ h/o HTN, HLD, BPPV, left hip fracture s/p IM nailing (1997), AFib (on eliquis), h/o meningioma, hx of fall , recent admission for flu p/w SOB and bradycardia.  Pt made DNR/DNI. Pressors are ok with daughter, no invasive measures.    Interval history: on bipap  s/p lasix 40 IVP x 1 and 80 IVP x 1     REVIEW OF SYSTEMS  Denies CP, Palpitation, SOB, Dyspnea [ X ] All other systems negative    MEDICATIONS  (STANDING):  furosemide   Injectable 40 milliGRAM(s) IV Push two times a day  heparin  Infusion.  Unit(s)/Hr (10 mL/Hr) IV Continuous <Continuous>    MEDICATIONS  (PRN):  heparin  Injectable 4000 Unit(s) IV Push every 6 hours PRN For aPTT less than 40  heparin  Injectable 2000 Unit(s) IV Push every 6 hours PRN For aPTT between 40 - 57    Allergy: No Known Allergies    ICU Vital Signs Last 24 Hrs  T(C): 36.7 (Max: 36.9 )  HR: 92 (46 - 92)  BP: 107/66 (83/- - 124/38)  RR: 23  (16 - 25)  SpO2: 100% (86% - 100%) on bipap    I&O's Summary  IN: 70 mL / OUT: 500 mL / NET: -430 mL    PHYSICAL EXAM  Appearance: Normal, NAD  EYES: EOMI, PERRLA, conjunctiva and sclera clear  NECK: Supple, No JVD  CHEST/LUNG: Diminished breath sounds at bases, No wheezing  HEART: Normal S1, S2. No murmurs, rubs, or gallops  ABDOMEN: + Bowel sounds, Soft, NT, ND   EXTREMITIES:  2+ Peripheral Pulses, No clubbing, cyanosis, or edema  NEUROLOGY: Lethargic responses to voice   SKIN: weeping edema right upper ex    Interpretation of Telemetry:                   12.1   7.7   )-----------( 184                  36.8     130<132<127  |  94<L>  |  54<H>  ----------------------------<  92  4.7   |  25  |  1.48<1.58<1.53    Ca    9.6   Phos  4.2   Mg     2.4      TPro  5.5<L>  /  Alb  2.6<L>  /  TBili  0.4  /  DBili  x   /  AST  15  /  ALT  13  /  AlkPhos  120  02-07      CARDIAC MARKERS ( 07 Feb 2018 05:30 )  x     / <0.01 ng/mL / 19 U/L / x     / 2.4 ng/mL  CARDIAC MARKERS ( 07 Feb 2018 00:28 )  x     / <0.01 ng/mL / 19 U/L / x     / 2.7 ng/mL  CARDIAC MARKERS ( 06 Feb 2018 17:56 )  x     / <0.01 ng/mL / 28 U/L / x     / 3.7 ng/mL  CARDIAC MARKERS ( 06 Feb 2018 14:50 )  x     / <0.01 ng/mL / x     / x     / 3.6 ng/mL      ABG - ( 07 Feb 2018 05:20 )  pH: 7.30  /  pCO2: 62    /  pO2: 95    / HCO3: 30    / Base Excess: 2.6   /  SaO2: 98

## 2018-02-07 NOTE — CHART NOTE - NSCHARTNOTEFT_GEN_A_CORE
====================  CCU MIDNIGHT ROUNDS  ====================    SARAH MOODY  [id number]    ====================  SUMMARY:  ====================    89 yo F HTN, HLD, BPPV, A fib ([id number] - eliquis), meningioma, recent hospitalization for flu, sent from cardiologist w/ ADHF and hypotension, s/p IV lasix, TTE 2/7 showing dec RV function, hyperdynamic LV     ====================  NEW EVENTS:  ====================    tolerating day dosing BB for HR, off esmolol gtt.     ====================  VITALS (Last 12 hrs):  ====================    T(C): 36.8 (02-07-18 @ 19:00), Max: 36.8 (02-07-18 @ 19:00)  HR: 107 (02-07-18 @ 20:00) (89 - 112)  BP: 93/54 (02-07-18 @ 20:00) (82/54 - 111/58)  BP(mean): 65 (02-07-18 @ 20:00) (59 - 71)  RR: 21 (02-07-18 @ 20:00) (14 - 21)  SpO2: 98% (02-07-18 @ 20:00) (98% - 100%)    TELEMETRY: a fib 100s     I&O's Summary    06 Feb 2018 07:01  -  07 Feb 2018 07:00  --------------------------------------------------------  IN: 70 mL / OUT: 500 mL / NET: -430 mL    07 Feb 2018 07:01  -  07 Feb 2018 21:57  --------------------------------------------------------  IN: 444 mL / OUT: 1000 mL / NET: -556 mL    ====================  PLAN:  ====================  - bipap PRN, mentating and saturating well off bipap  - c/w PO lopressor, increase as BP will tolerate to rate control  - eladia PRESTON/U  #[id number]

## 2018-02-08 DIAGNOSIS — I48.1 PERSISTENT ATRIAL FIBRILLATION: ICD-10-CM

## 2018-02-08 DIAGNOSIS — Z51.5 ENCOUNTER FOR PALLIATIVE CARE: ICD-10-CM

## 2018-02-08 DIAGNOSIS — I50.31 ACUTE DIASTOLIC (CONGESTIVE) HEART FAILURE: ICD-10-CM

## 2018-02-08 DIAGNOSIS — I95.9 HYPOTENSION, UNSPECIFIED: ICD-10-CM

## 2018-02-08 DIAGNOSIS — R06.00 DYSPNEA, UNSPECIFIED: ICD-10-CM

## 2018-02-08 LAB
ALBUMIN SERPL ELPH-MCNC: 2.6 G/DL — LOW (ref 3.3–5)
ALP SERPL-CCNC: 110 U/L — SIGNIFICANT CHANGE UP (ref 40–120)
ALP SERPL-CCNC: 112 U/L — SIGNIFICANT CHANGE UP (ref 40–120)
ALP SERPL-CCNC: 118 U/L — SIGNIFICANT CHANGE UP (ref 40–120)
ALT FLD-CCNC: 13 U/L RC — SIGNIFICANT CHANGE UP (ref 10–45)
ANION GAP SERPL CALC-SCNC: 10 MMOL/L — SIGNIFICANT CHANGE UP (ref 5–17)
ANION GAP SERPL CALC-SCNC: 12 MMOL/L — SIGNIFICANT CHANGE UP (ref 5–17)
ANION GAP SERPL CALC-SCNC: 8 MMOL/L — SIGNIFICANT CHANGE UP (ref 5–17)
AST SERPL-CCNC: 13 U/L — SIGNIFICANT CHANGE UP (ref 10–40)
AST SERPL-CCNC: 13 U/L — SIGNIFICANT CHANGE UP (ref 10–40)
AST SERPL-CCNC: 18 U/L — SIGNIFICANT CHANGE UP (ref 10–40)
BILIRUB SERPL-MCNC: 0.3 MG/DL — SIGNIFICANT CHANGE UP (ref 0.2–1.2)
BILIRUB SERPL-MCNC: 0.3 MG/DL — SIGNIFICANT CHANGE UP (ref 0.2–1.2)
BILIRUB SERPL-MCNC: 0.4 MG/DL — SIGNIFICANT CHANGE UP (ref 0.2–1.2)
BUN SERPL-MCNC: 53 MG/DL — HIGH (ref 7–23)
BUN SERPL-MCNC: 56 MG/DL — HIGH (ref 7–23)
BUN SERPL-MCNC: 59 MG/DL — HIGH (ref 7–23)
CALCIUM SERPL-MCNC: 10 MG/DL — SIGNIFICANT CHANGE UP (ref 8.4–10.5)
CALCIUM SERPL-MCNC: 9.7 MG/DL — SIGNIFICANT CHANGE UP (ref 8.4–10.5)
CALCIUM SERPL-MCNC: 9.9 MG/DL — SIGNIFICANT CHANGE UP (ref 8.4–10.5)
CHLORIDE SERPL-SCNC: 95 MMOL/L — LOW (ref 96–108)
CHLORIDE SERPL-SCNC: 96 MMOL/L — SIGNIFICANT CHANGE UP (ref 96–108)
CHLORIDE SERPL-SCNC: 96 MMOL/L — SIGNIFICANT CHANGE UP (ref 96–108)
CK MB CFR SERPL CALC: 2.2 NG/ML — SIGNIFICANT CHANGE UP (ref 0–3.8)
CK SERPL-CCNC: 24 U/L — LOW (ref 25–170)
CO2 SERPL-SCNC: 25 MMOL/L — SIGNIFICANT CHANGE UP (ref 22–31)
CO2 SERPL-SCNC: 27 MMOL/L — SIGNIFICANT CHANGE UP (ref 22–31)
CO2 SERPL-SCNC: 28 MMOL/L — SIGNIFICANT CHANGE UP (ref 22–31)
CREAT SERPL-MCNC: 1.56 MG/DL — HIGH (ref 0.5–1.3)
CREAT SERPL-MCNC: 1.58 MG/DL — HIGH (ref 0.5–1.3)
CREAT SERPL-MCNC: 1.73 MG/DL — HIGH (ref 0.5–1.3)
GLUCOSE SERPL-MCNC: 129 MG/DL — HIGH (ref 70–99)
GLUCOSE SERPL-MCNC: 91 MG/DL — SIGNIFICANT CHANGE UP (ref 70–99)
GLUCOSE SERPL-MCNC: 95 MG/DL — SIGNIFICANT CHANGE UP (ref 70–99)
HCT VFR BLD CALC: 37.3 % — SIGNIFICANT CHANGE UP (ref 34.5–45)
HGB BLD-MCNC: 12.1 G/DL — SIGNIFICANT CHANGE UP (ref 11.5–15.5)
MAGNESIUM SERPL-MCNC: 2.4 MG/DL — SIGNIFICANT CHANGE UP (ref 1.6–2.6)
MAGNESIUM SERPL-MCNC: 2.4 MG/DL — SIGNIFICANT CHANGE UP (ref 1.6–2.6)
MCHC RBC-ENTMCNC: 32.4 GM/DL — SIGNIFICANT CHANGE UP (ref 32–36)
MCHC RBC-ENTMCNC: 33 PG — SIGNIFICANT CHANGE UP (ref 27–34)
MCV RBC AUTO: 102 FL — HIGH (ref 80–100)
PHOSPHATE SERPL-MCNC: 4.4 MG/DL — SIGNIFICANT CHANGE UP (ref 2.5–4.5)
PHOSPHATE SERPL-MCNC: 4.6 MG/DL — HIGH (ref 2.5–4.5)
PLATELET # BLD AUTO: 215 K/UL — SIGNIFICANT CHANGE UP (ref 150–400)
POTASSIUM SERPL-MCNC: 5.6 MMOL/L — HIGH (ref 3.5–5.3)
POTASSIUM SERPL-MCNC: 5.6 MMOL/L — HIGH (ref 3.5–5.3)
POTASSIUM SERPL-MCNC: 5.7 MMOL/L — HIGH (ref 3.5–5.3)
POTASSIUM SERPL-SCNC: 5.6 MMOL/L — HIGH (ref 3.5–5.3)
POTASSIUM SERPL-SCNC: 5.6 MMOL/L — HIGH (ref 3.5–5.3)
POTASSIUM SERPL-SCNC: 5.7 MMOL/L — HIGH (ref 3.5–5.3)
PROT SERPL-MCNC: 5.5 G/DL — LOW (ref 6–8.3)
PROT SERPL-MCNC: 5.6 G/DL — LOW (ref 6–8.3)
PROT SERPL-MCNC: 5.7 G/DL — LOW (ref 6–8.3)
RBC # BLD: 3.66 M/UL — LOW (ref 3.8–5.2)
RBC # FLD: 15.6 % — HIGH (ref 10.3–14.5)
SODIUM SERPL-SCNC: 131 MMOL/L — LOW (ref 135–145)
SODIUM SERPL-SCNC: 133 MMOL/L — LOW (ref 135–145)
SODIUM SERPL-SCNC: 133 MMOL/L — LOW (ref 135–145)
TROPONIN T SERPL-MCNC: <0.01 NG/ML — SIGNIFICANT CHANGE UP (ref 0–0.06)
WBC # BLD: 8.8 K/UL — SIGNIFICANT CHANGE UP (ref 3.8–10.5)
WBC # FLD AUTO: 8.8 K/UL — SIGNIFICANT CHANGE UP (ref 3.8–10.5)

## 2018-02-08 PROCEDURE — 99223 1ST HOSP IP/OBS HIGH 75: CPT

## 2018-02-08 PROCEDURE — 99233 SBSQ HOSP IP/OBS HIGH 50: CPT | Mod: GC

## 2018-02-08 PROCEDURE — 93010 ELECTROCARDIOGRAM REPORT: CPT

## 2018-02-08 RX ORDER — DIGOXIN 250 MCG
0.5 TABLET ORAL ONCE
Qty: 0 | Refills: 0 | Status: DISCONTINUED | OUTPATIENT
Start: 2018-02-08 | End: 2018-02-08

## 2018-02-08 RX ORDER — ESMOLOL HCL 100MG/10ML
25 VIAL (ML) INTRAVENOUS
Qty: 2500 | Refills: 0 | Status: DISCONTINUED | OUTPATIENT
Start: 2018-02-08 | End: 2018-02-09

## 2018-02-08 RX ORDER — METOPROLOL TARTRATE 50 MG
2.5 TABLET ORAL ONCE
Qty: 0 | Refills: 0 | Status: COMPLETED | OUTPATIENT
Start: 2018-02-08 | End: 2018-02-08

## 2018-02-08 RX ORDER — DIGOXIN 250 MCG
0.25 TABLET ORAL ONCE
Qty: 0 | Refills: 0 | Status: COMPLETED | OUTPATIENT
Start: 2018-02-08 | End: 2018-02-08

## 2018-02-08 RX ORDER — METOPROLOL TARTRATE 50 MG
50 TABLET ORAL
Qty: 0 | Refills: 0 | Status: DISCONTINUED | OUTPATIENT
Start: 2018-02-08 | End: 2018-02-08

## 2018-02-08 RX ORDER — SODIUM POLYSTYRENE SULFONATE 4.1 MEQ/G
15 POWDER, FOR SUSPENSION ORAL ONCE
Qty: 0 | Refills: 0 | Status: COMPLETED | OUTPATIENT
Start: 2018-02-08 | End: 2018-02-08

## 2018-02-08 RX ORDER — ONDANSETRON 8 MG/1
4 TABLET, FILM COATED ORAL ONCE
Qty: 0 | Refills: 0 | Status: COMPLETED | OUTPATIENT
Start: 2018-02-08 | End: 2018-02-08

## 2018-02-08 RX ORDER — AMIODARONE HYDROCHLORIDE 400 MG/1
150 TABLET ORAL ONCE
Qty: 0 | Refills: 0 | Status: DISCONTINUED | OUTPATIENT
Start: 2018-02-08 | End: 2018-02-08

## 2018-02-08 RX ORDER — DIGOXIN 250 MCG
0.12 TABLET ORAL DAILY
Qty: 0 | Refills: 0 | Status: DISCONTINUED | OUTPATIENT
Start: 2018-02-09 | End: 2018-02-09

## 2018-02-08 RX ORDER — FUROSEMIDE 40 MG
40 TABLET ORAL ONCE
Qty: 0 | Refills: 0 | Status: COMPLETED | OUTPATIENT
Start: 2018-02-08 | End: 2018-02-08

## 2018-02-08 RX ADMIN — SODIUM POLYSTYRENE SULFONATE 15 GRAM(S): 4.1 POWDER, FOR SUSPENSION ORAL at 13:01

## 2018-02-08 RX ADMIN — APIXABAN 2.5 MILLIGRAM(S): 2.5 TABLET, FILM COATED ORAL at 05:51

## 2018-02-08 RX ADMIN — Medication 40 MILLIGRAM(S): at 05:15

## 2018-02-08 RX ADMIN — ONDANSETRON 4 MILLIGRAM(S): 8 TABLET, FILM COATED ORAL at 21:46

## 2018-02-08 RX ADMIN — Medication 0.25 MILLIGRAM(S): at 16:15

## 2018-02-08 RX ADMIN — Medication 0.25 MILLIGRAM(S): at 11:52

## 2018-02-08 RX ADMIN — Medication 2.5 MILLIGRAM(S): at 01:39

## 2018-02-08 RX ADMIN — Medication 8.01 MICROGRAM(S)/KG/MIN: at 03:30

## 2018-02-08 RX ADMIN — APIXABAN 2.5 MILLIGRAM(S): 2.5 TABLET, FILM COATED ORAL at 17:10

## 2018-02-08 NOTE — CONSULT NOTE ADULT - SUBJECTIVE AND OBJECTIVE BOX
HPI: 89F with PMH of HTN, HLD, BPPV, L hip fracture (s/p IM nailing in ), AF, meningioma, and fall who was admitted after dyspnea and lethargy at outpatient cardiology appointment, and admitted for acute diastolic heart failure exacerbation. Patient was hospitalized for influenza a month ago, during which she had AMS attributed to robitussin use. Currently being monitored in the CCU for pulmonary edema, but diuretics held to avoid LVOT obstruction, complicated by rapid AF. Has required BiPAP but patient not fully adherent.     PERTINENT PMH REVIEWED:  [ ] YES [ ] NO           SOCIAL HISTORY:   Significant other/partner:  [ ] YES  [X ] NO               Children:  [X ] YES  [ ] NO                   Temple/Spirituality: Religious  Substance hx:  [ ] YES   [ ] NO                   Tobacco hx:  [ ] YES  [X ] NO                       Alcohol hx: [ ] YES  [X ] NO         Home Opioid hx:  [ ] YES  [ ] NO   Living Situation: [ ] Home  [ ] Long term care  [ ] Rehab [ ] Other    FAMILY HISTORY:  FAMILY HISTORY:  No pertinent family history in first degree relatives    [ X] Family history non-contributory     BASELINE (I)ADLs (prior to admission):  [place]: [X] total  [ ] moderate [ ] dependent    ADVANCE DIRECTIVES:    DNR [X ] YES [ ] NO                            [ ] Completed  MOLST  [ ] YES [ ] NO                      [ ] Completed  Health Care Proxy [X ] YES  [ ] NO   [ ] Completed  Living Will  [ ] YES [ ] NO             [ ] Surrogate  [ ] HCP  [ ] Guardian:         Daughter: Perri Jean [phone]                                      ALLERGIES:   Allergies    No Known Allergies    Intolerances        MEDICATIONS:   MEDICATIONS  (STANDING):  apixaban 2.5 milliGRAM(s) Oral every 12 hours  esMOLOL  Infusion 25 MICROgram(s)/kG/Min (8.01 mL/Hr) IV Continuous <Continuous>    MEDICATIONS  (PRN):  artificial  tears Solution 1 Drop(s) Right EYE every 6 hours PRN Dry Eyes      PRESENT SYMPTOMS:  Source: [ ] Patient   [ ] Family   [ ] Team     Pain:                        [ ] No [ ] Yes             [ ] Mild [ ] Moderate [ ] Severe    Onset -  Location -  Duration -  Character -  Alleviating/Aggravating -  Radiation -  Timing -      Dyspnea:                [ ] No [ ] Yes             [ ] Mild [ ] Moderate [ ] Severe    Anxiety:                  [ ] No [ ] Yes             [ ] Mild [ ] Moderate [ ] Severe    Fatigue:                  [ ] No [ ] Yes             [ ] Mild [ ] Moderate [ ] Severe    Nausea:                  [ ] No [ ] Yes             [ ] Mild [ ] Moderate [ ] Severe    Loss of appetite:   [ ] No [ ] Yes             [ ] Mild [ ] Moderate [ ] Severe    Constipation:        [ ] No [ ] Yes             [ ] Mild [ ] Moderate [ ] Severe    Other Symptoms:  [ ] All other review of systems negative   [ ] Unable to obtain due to poor mentation     Karnofsky Performance Score/Palliative Performance Status Version 2:         %    PHYSICAL EXAM:  Vital Signs Last 24 Hrs  T(C): 36.9 (2018 07:00), Max: 37.1 (2018 05:00)  T(F): 98.4 (2018 07:00), Max: 98.8 (2018 05:00)  HR: 121 (2018 13:00) (72 - 133)  BP: 93/45 (2018 13:00) (81/45 - 110/81)  BP(mean): 60 (2018 13:00) (54 - 93)  RR: 19 (2018 13:00) (12 - 21)  SpO2: 99% (2018 13:00) (98% - 100%) I&O's Summary    2018 07:  -  2018 07:00  --------------------------------------------------------  IN: 658 mL / OUT: 1360 mL / NET: -702 mL    2018 07:  -  2018 14:27  --------------------------------------------------------  IN: 56 mL / OUT: 45 mL / NET: 11 mL        General:  [ ] Alert  [ ] Oriented x      [ ] Lethargic  [ ] Agitated   [ ] Cachexia   [ ] Unarousable  [ ] Verbal  [ ] Non-Verbal  [ ] Sedated    HEENT:  [ ] Normal   [ ] Dry mouth   [ ] ET Tube    [ ] Trach  [ ] Oral lesions    Lungs:   [ ] Clear [ ] Tachypnea  [ ] Audible excessive secretions   [ ] Rhonchi        [ ] Right [ ] Left [ ] Bilateral  [ ] Crackles        [ ] Right [ ] Left [ ] Bilateral  [ ] Wheezing     [ ] Right [ ] Left [ ] Bilateral    Cardiovascular:  [ ] Regular [ ] Irregular [ ] Tachycardia   [ ] Bradycardia  [ ] Murmur [ ] Other    Abdomen: [ ] Soft  [ ] Distended   [ ] +BS  [ ] Non tender [ ] Tender  [ ]PEG   [ ]OGT/ NGT   [ ] Ostomy   Last BM:       Genitourinary: [ ] Normal [ ] Incontinent   [ ] Oliguria/Anuria   [ ] Gaxiola   [ ] No gaxiola    Musculoskeletal:  [ ] Normal   [ ] Weakness  [ ] Bedbound/Wheelchair bound [ ] Edema    Neurological: [ ] No focal deficits  [ ] Cognitive impairment  [ ] Dysphagia [ ] Dysarthria [ ] Paresis [ ] Other     Skin: [ ] Normal   [ ] Pressure ulcer(s)     [ ] Rash   [ ] Other    LABS:  [ ] Critical Care time for unstable patient with organ failure.  Total Time:                 minutes      133<L>  |  95<L>  |  56<H>  ----------------------------<  129<H>  5.6<H>   |  28  |  1.73<H>    Ca    10.0      2018 11:49  Phos  4.6     02-  Mg     2.4     -08    TPro  5.7<L>  /  Alb  2.6<L>  /  TBili  0.4  /  DBili  x   /  AST  13  /  ALT  13  /  AlkPhos  110  02-08    PT/INR - ( 2018 05:30 )   PT: 15.2 sec;   INR: 1.39 ratio         PTT - ( 2018 05:30 )  PTT:> 200 sec  Urinalysis Basic - ( 2018 15:39 )    Color: Yellow / Appearance: Clear / S.015 / pH: x  Gluc: x / Ketone: Negative  / Bili: Negative / Urobili: Negative   Blood: x / Protein: Trace / Nitrite: Negative   Leuk Esterase: Negative / RBC: x / WBC 3-5 /HPF   Sq Epi: x / Non Sq Epi: OCC /HPF / Bacteria: Few /HPF        Shock: [ ] Septic [ ] Cardiogenic [ ] Neurologic [ ] Hypovolemic  Vasopressors x   Inotrophs x     Protein Calorie Malnutrition: [ ] Mild [ ] Moderate [ ] Severe  Oral Intake: [ ] Unable/mouth care only [ ] Minimal [ ] Moderate [ ] Full Capability  Diet: [ ] NPO [ ] Tube feeds [ ] TPN [ ] Other     RADIOLOGY & ADDITIONAL STUDIES:    REFERRALS:   [ ] Chaplaincy  [ ] Hospice  [ ] Child Life  [ ] Social Work  [ ] Case management [ ] Holistic Therapy HPI: 89F with PMH of HTN, HLD, BPPV, L hip fracture (s/p IM nailing in ), AF, meningioma, and fall who was admitted after dyspnea and lethargy at outpatient cardiology appointment, and admitted for acute diastolic heart failure exacerbation. Patient was hospitalized for influenza a month ago, during which she had AMS attributed to robitussin use. Currently being monitored in the CCU for pulmonary edema, but diuretics held to avoid LVOT obstruction, complicated by rapid AF. Has required BiPAP but patient not fully adherent.     PERTINENT PMH REVIEWED:  [ ] YES [ ] NO           SOCIAL HISTORY:   Significant other/partner:  [ ] YES  [X ] NO               Children:  [X ] YES  [ ] NO                   Episcopal/Spirituality: Sabianist  Substance hx:  [ ] YES   [ ] NO                   Tobacco hx:  [ ] YES  [X ] NO                       Alcohol hx: [ ] YES  [X ] NO         Home Opioid hx:  [ ] YES  [X ] NO  ([id number])  Living Situation: [ ] Home  [ ] Long term care  [ ] Rehab [X ] Other [facility]    FAMILY HISTORY:  FAMILY HISTORY:  No pertinent family history in first degree relatives    [ X] Family history non-contributory     BASELINE (I)ADLs (prior to admission):  [place]: [X] total  [ ] moderate [ ] dependent    ADVANCE DIRECTIVES:    DNR [X ] YES [ ] NO                            [ ] Completed  MOLST  [ ] YES [ ] NO                      [ ] Completed  Health Care Proxy [X ] YES  [ ] NO   [ ] Completed  Living Will  [ ] YES [ ] NO             [ ] Surrogate  [X] HCP  [ ] Guardian:         Daughter: Perri Jean [phone]                                      ALLERGIES:   Allergies    No Known Allergies    Intolerances        MEDICATIONS:   MEDICATIONS  (STANDING):  apixaban 2.5 milliGRAM(s) Oral every 12 hours  esMOLOL  Infusion 25 MICROgram(s)/kG/Min (8.01 mL/Hr) IV Continuous <Continuous>    MEDICATIONS  (PRN):  artificial  tears Solution 1 Drop(s) Right EYE every 6 hours PRN Dry Eyes      PRESENT SYMPTOMS:  Source: [ X] Patient   [X ] Family   [X ] Team     Pain:                        [X ] No [ ] Yes             [ ] Mild [ ] Moderate [ ] Severe    Onset -  Location -  Duration -  Character -  Alleviating/Aggravating -  Radiation -  Timing -      Dyspnea:                [X ] No [ ] Yes             [ ] Mild [ ] Moderate [ ] Severe    Anxiety:                  [X ] No [ ] Yes             [ ] Mild [ ] Moderate [ ] Severe    Fatigue:                  [X ] No [ ] Yes             [ ] Mild [ ] Moderate [ ] Severe    Nausea:                  [ ] No [ ] Yes             [ ] Mild [ ] Moderate [ ] Severe    Loss of appetite:   [ ] No [ ] Yes             [ ] Mild [ ] Moderate [ ] Severe    Constipation:        [ ] No [ ] Yes             [ ] Mild [ ] Moderate [ ] Severe    Other Symptoms:  [ ] All other review of systems negative   [X ] Unable to obtain due to patient tiredness    Karnofsky Performance Score/Palliative Performance Status Version 2:     50%    PHYSICAL EXAM:  Vital Signs Last 24 Hrs  T(C): 36.9 (2018 07:00), Max: 37.1 (2018 05:00)  T(F): 98.4 (2018 07:00), Max: 98.8 (2018 05:00)  HR: 121 (2018 13:00) (72 - 133)  BP: 93/45 (2018 13:00) (81/45 - 110/81)  BP(mean): 60 (2018 13:00) (54 - 93)  RR: 19 (2018 13:00) (12 - 21)  SpO2: 99% (2018 13:00) (98% - 100%) I&O's Summary    2018 07:  -  2018 07:00  --------------------------------------------------------  IN: 658 mL / OUT: 1360 mL / NET: -702 mL    2018 07:  -  2018 14:27  --------------------------------------------------------  IN: 56 mL / OUT: 45 mL / NET: 11 mL        General:  [ ] Alert  [ ] Oriented x      [X ] Lethargic  [ ] Agitated   [ ] Cachexia   [ ] Unarousable  [X ] Verbal  [ ] Non-Verbal  [ ] Sedated    HEENT:  [ ] Normal   [ ] Dry mouth   [ ] ET Tube    [ ] Trach  [ ] Oral lesions   [X] Clear conjunctiva    Lungs:   [X ] Clear [ ] Tachypnea  [ ] Audible excessive secretions   [ ] Rhonchi        [ ] Right [ ] Left [ ] Bilateral  [ ] Crackles        [ ] Right [ ] Left [ ] Bilateral  [ ] Wheezing     [ ] Right [ ] Left [ ] Bilateral    Cardiovascular:  [X ] Regular [ ] Irregular [ ] Tachycardia   [ ] Bradycardia  [ ] Murmur [ ] Other    Abdomen: [X ] Soft  [ ] Distended   [X ] +BS  [X ] Non tender [ ] Tender  [ ]PEG   [ ]OGT/ NGT   [ ] Ostomy   Last BM:       Genitourinary: [ ] Normal [ ] Incontinent   [ ] Oliguria/Anuria   [X ] Gaxiola   [ ] No gaxiola    Musculoskeletal:  [ ] Normal   [ ] Weakness  [ ] Bedbound/Wheelchair bound [ ] Edema    Neurological: [X ] No focal deficits  [ ] Cognitive impairment  [ ] Dysphagia [ ] Dysarthria [ ] Paresis [ ] Other     Skin: [ X] Normal   [ ] Pressure ulcer(s)     [ ] Rash   [ ] Other    LABS: reviewed        133<L>  |  95<L>  |  56<H>  ----------------------------<  129<H>  5.6<H>   |  28  |  1.73<H>    Ca    10.0      2018 11:49  Phos  4.6       Mg     2.4         TPro  5.7<L>  /  Alb  2.6<L>  /  TBili  0.4  /  DBili  x   /  AST  13  /  ALT  13  /  AlkPhos  110  02-08    PT/INR - ( 2018 05:30 )   PT: 15.2 sec;   INR: 1.39 ratio         PTT - ( 2018 05:30 )  PTT:> 200 sec  Urinalysis Basic - ( 2018 15:39 )    Color: Yellow / Appearance: Clear / S.015 / pH: x  Gluc: x / Ketone: Negative  / Bili: Negative / Urobili: Negative   Blood: x / Protein: Trace / Nitrite: Negative   Leuk Esterase: Negative / RBC: x / WBC 3-5 /HPF   Sq Epi: x / Non Sq Epi: OCC /HPF / Bacteria: Few /HPF    Protein Calorie Malnutrition: [ ] Mild [ ] Moderate [ ] Severe  Oral Intake: [ ] Unable/mouth care only [ ] Minimal [X ] Moderate [ ] Full Capability  Diet: [ ] NPO [ ] Tube feeds [ ] TPN [X ] Other soft diet    RADIOLOGY & ADDITIONAL STUDIES:  CXR 18  Unchanged bilateral lower lung patchy opacities.   Unchanged small bilateral pleural effusions with adjacent atelectasis.    REFERRALS:   [ ] Chaplaincy  [ ] Hospice  [ ] Child Life  [ ] Social Work  [ ] Case management [ ] Holistic Therapy

## 2018-02-08 NOTE — CONSULT NOTE ADULT - PROBLEM SELECTOR RECOMMENDATION 3
Currently on NC, appears to have mild dyspnea but patient herself denies. Would benefit from BiPAP, tried nasal BiPAP earlier which was too uncomfortable for patient. DNR/DNI. If patient continues to refuse BiPAP, concern for her tiring out leading to respiratory compromise. Family aware.

## 2018-02-08 NOTE — CONSULT NOTE ADULT - ASSESSMENT
89F with PMH of HTN, HLD, BPPV, L hip fracture (s/p IM nailing in 1997), AF, meningioma, and fall who was admitted after dyspnea and lethargy at outpatient cardiology appointment, and admitted for acute diastolic heart failure exacerbation. Patient was hospitalized for influenza a month ago, during which she had AMS attributed to robitussin use. Currently being monitored in the CCU for pulmonary edema, but diuretics held to avoid LVOT obstruction, complicated by rapid AF. Has required BiPAP but patient not fully adherent.

## 2018-02-08 NOTE — PROGRESS NOTE ADULT - SUBJECTIVE AND OBJECTIVE BOX
Patient is a 89y old  Female who presents with a chief complaint of Dyspnea (06 Feb 2018 17:40)    HPI:  This is a 89 y/o female with h/o HTN, HLD, BPPV, left hip fracture s/p IM nailing (1997), AFib (on eliquis), h/o meningioma, hx of fall who was brought to cardiologist today for appointment, noted in office to be dyspneic, SOB, with increased lethargy and instructed to report to ED. Pt was brought to ED by daughter who also notes increased LE edema. Of note, pt hospitalized for influenza approx 1 month ago and had BP medications increased. AMS last admission attributed to robitussin use. Pt denies CP, LOC, palpitations, fever, chills, n/v, rash, diarrhea, productive cough. Pt placed on bipap in ED and given 500cc fluid for hypotension. Pt also noted to be bradycardic. Pt admitted to CCU2 for further management/workup.    Daughter is patient's healthcare proxy, pt made DNR/DNI. Pressors are ok with daughter, no invasive measures. (06 Feb 2018 17:40)    Overnight Event:    REVIEW OF SYSTEMS:  CONSTITUTIONAL: No weakness, fevers or chills  Eyes/ENT: No visual changes: No vertigo or throat pain  NECK: No pain or stiffness  Resp: No cough, wheezing, hemoptysis; No shortness of breath  Cardiovascular: No chest pain or palpitations  GI: No abdominal or epigastric pain. NO nausea, vomiting, or hematemesis: No diarrhea or constipation. No melena or hematochezia.    : No dysuria, frequent or hematuria.  Neuro: No numbness or weakness  Skin: No itching or rashes	    MEDICATIONS  (STANDING):  apixaban 2.5 milliGRAM(s) Oral every 12 hours  esMOLOL  Infusion 25 MICROgram(s)/kG/Min (8.01 mL/Hr) IV Continuous <Continuous>    MEDICATIONS  (PRN):  artificial  tears Solution 1 Drop(s) Right EYE every 6 hours PRN Dry Eyes        PHYSICAL EXAM:  Vital Signs Last 24 Hrs  T(C): 36.9 (08 Feb 2018 07:00), Max: 37.1 (08 Feb 2018 05:00)  T(F): 98.4 (08 Feb 2018 07:00), Max: 98.8 (08 Feb 2018 05:00)  HR: 113 (08 Feb 2018 08:00) (72 - 125)  BP: 89/48 (08 Feb 2018 08:00) (81/45 - 111/58)  BP(mean): 58 (08 Feb 2018 08:00) (54 - 93)  RR: 15 (08 Feb 2018 08:00) (12 - 21)  SpO2: 100% (08 Feb 2018 08:00) (98% - 100%)  I&O's Summary    07 Feb 2018 07:01  -  08 Feb 2018 07:00  --------------------------------------------------------  IN: 658 mL / OUT: 1360 mL / NET: -702 mL        Appearance: Normal	  HEENT:   Normal oral mucosa, PERRL, EOMI	  Lymphatic: No lymphadenopathy  Cardiovascular: Normal S1 S2, No JVD, No murmurs, No edema  Respiratory: Lungs clear to auscultation	  Psychiatry: A & O x 3, Mood & affect appropriate  Gastrointestinal:  Soft, Non-tender, + BS	  Skin: No rashes, No ecchymoses, No cyanosis	  Neurologic: Non-focal  Extremities: Normal range of motion, No clubbing, cyanosis or edema  Vascular: Peripheral pulses palpable 2+ bilaterally    LABS:	 	                        12.1   8.8   )-----------( 215      ( 08 Feb 2018 02:28 )             37.3     Auto Eosinophil # x     / Auto Eosinophil % x     / Auto Neutrophil # x     / Auto Neutrophil % x     / BANDS % x                            12.1   7.7   )-----------( 184      ( 07 Feb 2018 05:30 )             36.8     Auto Eosinophil # x     / Auto Eosinophil % x     / Auto Neutrophil # x     / Auto Neutrophil % x     / BANDS % x                            12.5   8.1   )-----------( 250      ( 06 Feb 2018 14:50 )             39.0     Auto Eosinophil # 0.0   / Auto Eosinophil % 0.4   / Auto Neutrophil # 6.7   / Auto Neutrophil % 82.5  / BANDS % x        INR: 1.39 ratio (02-07 @ 05:30)  INR: 1.33 ratio (02-06 @ 22:53)    02-08    133<L>  |  96  |  53<H>  ----------------------------<  91  5.6<H>   |  25  |  1.58<H>  02-08    131<L>  |  96  |  59<H>  ----------------------------<  95  5.7<H>   |  27  |  1.56<H>  02-07    130<L>  |  94<L>  |  54<H>  ----------------------------<  92  4.7   |  25  |  1.48<H>    Ca    9.7      08 Feb 2018 03:50  Mg     2.4     02-08  Phos  4.4     02-08  TPro  5.6<L>  /  Alb  2.6<L>  /  TBili  0.3  /  DBili  x   /  AST  13  /  ALT  13  /  AlkPhos  112  02-08  TPro  5.5<L>  /  Alb  2.6<L>  /  TBili  0.3  /  DBili  x   /  AST  18  /  ALT  13  /  AlkPhos  118  02-08  TPro  5.5<L>  /  Alb  2.6<L>  /  TBili  0.4  /  DBili  x   /  AST  15  /  ALT  13  /  AlkPhos  120  02-07        proBNP: Serum Pro-Brain Natriuretic Peptide: 3468 pg/mL (02-06 @ 14:50)    Lipid Profile: 01-03 Chol 114 LDL 45 HDL 47 Trig 109  HgA1c: 6.0 % (01-03 @ 07:21)    TSH:     CARDIAC MARKERS:   08 Feb 2018 02:28 Troponin <0.01 ng/mL / Creatine Kinase 24 U/L /  CKMB 2.2 ng/mL / CPK Mass Assay % x       07 Feb 2018 05:30 Troponin <0.01 ng/mL / Creatine Kinase 19 U/L /  CKMB 2.4 ng/mL / CPK Mass Assay % x       07 Feb 2018 00:28 Troponin <0.01 ng/mL / Creatine Kinase 19 U/L /  CKMB 2.7 ng/mL / CPK Mass Assay % x            TELEMETRY: 	 Afib @ 110   ECG:  	afib 108   RADIOLOGY:  OTHER: 	    PREVIOUS DIAGNOSTIC TESTING:    [ ] Echocardiogram:  Mitral annular calcification and calcified mitral  leaflets with decreased diastolic opening. Mild mitral  regurgitation.  Mean transmitral valve gradient equals 4 mm  Hg, consistent with mild mitral stenosis.  2. Calcified aortic valve with mildly decreased opening.  Mean transaortic valve gradient equals 7 mm Hg, aortic  valve velocity time integral equals 43 cm. Mild aortic  regurgitation.  3. Hyperdynamic left ventricular systolic function.  4. Right ventricular enlargement with decreased right  ventricular systolic function. TAPSE 1.4 cm (decreased)  5. Estimated pulmonary artery systolic pressure equals 51  mm Hg, assuming right atrial pressure equals 15 mm Hg,  consistent with moderate pulmonary pressures.  6. Trivial pericardial effusion.  *** Compared with echocardiogram of 1/4/2018, right    	  Joe Worthington  Contact #

## 2018-02-08 NOTE — PROGRESS NOTE ADULT - PROBLEM SELECTOR PLAN 4
PRessure improved with Bipap Pt states and understands necessity of bipap but does not want to wear it.  Possible pallitive consult for disposition?

## 2018-02-08 NOTE — CONSULT NOTE ADULT - PROBLEM SELECTOR RECOMMENDATION 9
With pulmonary edema, likely main etiology of dyspnea, currently stable on NC but would benefit from BiPAP if patient agreeable. Lasix held to prevent LVOT obstruction. Continue to monitor.

## 2018-02-08 NOTE — CONSULT NOTE ADULT - PROBLEM SELECTOR RECOMMENDATION 4
Discussed with patient's son and daughter-in-law; patient was awake near the end of my evaluation. Family wanted to find out more information about palliative care, both inpatient, and especially in regards to disposition. Given worsening cardiorenal syndrome with HFpEF and AF, and nonadherence to BiPAP, respiratory condition may worsen. Family made aware of the role of palliative care and services offered. We will continue to follow to provide symptom and family support as clinical status changes. Discussed with CCU NP.

## 2018-02-09 LAB
ALBUMIN SERPL ELPH-MCNC: 2.9 G/DL — LOW (ref 3.3–5)
ALP SERPL-CCNC: 119 U/L — SIGNIFICANT CHANGE UP (ref 40–120)
ALT FLD-CCNC: 13 U/L RC — SIGNIFICANT CHANGE UP (ref 10–45)
ANION GAP SERPL CALC-SCNC: 12 MMOL/L — SIGNIFICANT CHANGE UP (ref 5–17)
APTT BLD: 37.4 SEC — SIGNIFICANT CHANGE UP (ref 27.5–37.4)
AST SERPL-CCNC: 15 U/L — SIGNIFICANT CHANGE UP (ref 10–40)
BILIRUB SERPL-MCNC: 0.4 MG/DL — SIGNIFICANT CHANGE UP (ref 0.2–1.2)
BUN SERPL-MCNC: 67 MG/DL — HIGH (ref 7–23)
CALCIUM SERPL-MCNC: 10.1 MG/DL — SIGNIFICANT CHANGE UP (ref 8.4–10.5)
CHLORIDE SERPL-SCNC: 96 MMOL/L — SIGNIFICANT CHANGE UP (ref 96–108)
CO2 SERPL-SCNC: 26 MMOL/L — SIGNIFICANT CHANGE UP (ref 22–31)
CREAT SERPL-MCNC: 1.85 MG/DL — HIGH (ref 0.5–1.3)
DIGOXIN SERPL-MCNC: 3 NG/ML — CRITICAL HIGH (ref 0.8–2)
GLUCOSE BLDC GLUCOMTR-MCNC: 170 MG/DL — HIGH (ref 70–99)
GLUCOSE BLDC GLUCOMTR-MCNC: 244 MG/DL — HIGH (ref 70–99)
GLUCOSE SERPL-MCNC: 115 MG/DL — HIGH (ref 70–99)
HCT VFR BLD CALC: 40.4 % — SIGNIFICANT CHANGE UP (ref 34.5–45)
HGB BLD-MCNC: 12.6 G/DL — SIGNIFICANT CHANGE UP (ref 11.5–15.5)
INR BLD: 1.21 RATIO — HIGH (ref 0.88–1.16)
MAGNESIUM SERPL-MCNC: 2.6 MG/DL — SIGNIFICANT CHANGE UP (ref 1.6–2.6)
MCHC RBC-ENTMCNC: 31.1 GM/DL — LOW (ref 32–36)
MCHC RBC-ENTMCNC: 32.1 PG — SIGNIFICANT CHANGE UP (ref 27–34)
MCV RBC AUTO: 103 FL — HIGH (ref 80–100)
PHOSPHATE SERPL-MCNC: 5.5 MG/DL — HIGH (ref 2.5–4.5)
PLATELET # BLD AUTO: 185 K/UL — SIGNIFICANT CHANGE UP (ref 150–400)
POTASSIUM SERPL-MCNC: 5.9 MMOL/L — HIGH (ref 3.5–5.3)
POTASSIUM SERPL-SCNC: 5.9 MMOL/L — HIGH (ref 3.5–5.3)
PROT SERPL-MCNC: 6 G/DL — SIGNIFICANT CHANGE UP (ref 6–8.3)
PROTHROM AB SERPL-ACNC: 13.2 SEC — HIGH (ref 9.8–12.7)
RBC # BLD: 3.91 M/UL — SIGNIFICANT CHANGE UP (ref 3.8–5.2)
RBC # FLD: 15.5 % — HIGH (ref 10.3–14.5)
SODIUM SERPL-SCNC: 134 MMOL/L — LOW (ref 135–145)
WBC # BLD: 9.9 K/UL — SIGNIFICANT CHANGE UP (ref 3.8–10.5)
WBC # FLD AUTO: 9.9 K/UL — SIGNIFICANT CHANGE UP (ref 3.8–10.5)

## 2018-02-09 PROCEDURE — 99233 SBSQ HOSP IP/OBS HIGH 50: CPT

## 2018-02-09 PROCEDURE — 93010 ELECTROCARDIOGRAM REPORT: CPT

## 2018-02-09 RX ORDER — DEXTROSE 50 % IN WATER 50 %
50 SYRINGE (ML) INTRAVENOUS ONCE
Qty: 0 | Refills: 0 | Status: COMPLETED | OUTPATIENT
Start: 2018-02-09 | End: 2018-02-09

## 2018-02-09 RX ORDER — ROBINUL 0.2 MG/ML
0.4 INJECTION INTRAMUSCULAR; INTRAVENOUS EVERY 6 HOURS
Qty: 0 | Refills: 0 | Status: DISCONTINUED | OUTPATIENT
Start: 2018-02-09 | End: 2018-02-10

## 2018-02-09 RX ORDER — DEXTROSE 50 % IN WATER 50 %
25 SYRINGE (ML) INTRAVENOUS ONCE
Qty: 0 | Refills: 0 | Status: DISCONTINUED | OUTPATIENT
Start: 2018-02-09 | End: 2018-02-09

## 2018-02-09 RX ORDER — HYDROMORPHONE HYDROCHLORIDE 2 MG/ML
0.2 INJECTION INTRAMUSCULAR; INTRAVENOUS; SUBCUTANEOUS
Qty: 0 | Refills: 0 | Status: DISCONTINUED | OUTPATIENT
Start: 2018-02-09 | End: 2018-02-10

## 2018-02-09 RX ORDER — ACETAMINOPHEN 500 MG
650 TABLET ORAL EVERY 6 HOURS
Qty: 0 | Refills: 0 | Status: DISCONTINUED | OUTPATIENT
Start: 2018-02-09 | End: 2018-02-10

## 2018-02-09 RX ORDER — INSULIN HUMAN 100 [IU]/ML
10 INJECTION, SOLUTION SUBCUTANEOUS ONCE
Qty: 0 | Refills: 0 | Status: COMPLETED | OUTPATIENT
Start: 2018-02-09 | End: 2018-02-09

## 2018-02-09 RX ORDER — HYDROMORPHONE HYDROCHLORIDE 2 MG/ML
0.2 INJECTION INTRAMUSCULAR; INTRAVENOUS; SUBCUTANEOUS
Qty: 0 | Refills: 0 | Status: DISCONTINUED | OUTPATIENT
Start: 2018-02-09 | End: 2018-02-09

## 2018-02-09 RX ADMIN — Medication 50 MILLILITER(S): at 10:19

## 2018-02-09 RX ADMIN — APIXABAN 2.5 MILLIGRAM(S): 2.5 TABLET, FILM COATED ORAL at 06:57

## 2018-02-09 RX ADMIN — INSULIN HUMAN 10 UNIT(S): 100 INJECTION, SOLUTION SUBCUTANEOUS at 10:17

## 2018-02-09 NOTE — PROGRESS NOTE ADULT - PROBLEM SELECTOR PROBLEM 1
Acute diastolic heart failure
Pulmonary edema cardiac cause
Persistent atrial fibrillation
Pulmonary edema cardiac cause

## 2018-02-09 NOTE — PROGRESS NOTE ADULT - PROBLEM SELECTOR PLAN 2
Esmolol gtt stopped, continues to be tachycardic. Family aware continuous cardiac monitoring will not continue in PCU.
resolved
Afib rate controlled (on dilt at home)  Hep gtt for now
Family expressed that they want Palliative care for inpatient adm  Discussed with Palliative   Dilaudid 0.2 iv q1hr PRN for comfort care

## 2018-02-09 NOTE — CHART NOTE - NSCHARTNOTEFT_GEN_A_CORE
CCU MIDNIGHT ROUNDS    SARAH MOODY  [id number]  89y  Female    SUMMARY:    HPI:  This is a 89 y/o female with h/o HTN, HLD, BPPV, left hip fracture s/p IM nailing (1997), AFib (on eliquis), h/o meningioma, hx of fall who was brought to cardiologist today for appointment, noted in office to be dyspneic, SOB, with increased lethargy and instructed to report to ED. Pt was brought to ED by daughter who also notes increased LE edema. Of note, pt hospitalized for influenza approx 1 month ago and had BP medications increased. AMS last admission attributed to robitussin use. Pt denies CP, LOC, palpitations, fever, chills, n/v, rash, diarrhea, productive cough. Pt placed on bipap in ED and given 500cc fluid for hypotension. Pt also noted to be bradycardic. Pt admitted to CCU2 for further management/workup.    Daughter is patient's healthcare proxy, pt made DNR/DNI. Pressors are ok with daughter, no invasive measures. (06 Feb 2018 17:40)      NEW EVENTS:Patient has been responding to IV digoxin, and her heart rate is in 100-110's overnight. She has been using Bipap and is saturating in 95-97 range.      UPDATED VITALS:    ICU Vital Signs Last 24 Hrs  T(C): 36.6 (08 Feb 2018 19:00), Max: 37.1 (08 Feb 2018 05:00)  T(F): 97.9 (08 Feb 2018 19:00), Max: 98.8 (08 Feb 2018 05:00)  HR: 120 (09 Feb 2018 00:52) (96 - 133)  BP: 91/57 (09 Feb 2018 00:00) (81/45 - 110/81)  BP(mean): 63 (09 Feb 2018 00:00) (54 - 93)  ABP: --  ABP(mean): --  RR: 14 (09 Feb 2018 00:00) (11 - 21)  SpO2: 99% (09 Feb 2018 00:52) (97% - 100%)      I&O's Summary    07 Feb 2018 07:01  -  08 Feb 2018 07:00  --------------------------------------------------------  IN: 658 mL / OUT: 1360 mL / NET: -702 mL    08 Feb 2018 07:01  -  09 Feb 2018 01:27  --------------------------------------------------------  IN: 136 mL / OUT: 180 mL / NET: -44 mL            NEW LABS:                          12.1   8.8   )-----------( 215      ( 08 Feb 2018 02:28 )             37.3     02-08    133<L>  |  95<L>  |  56<H>  ----------------------------<  129<H>  5.6<H>   |  28  |  1.73<H>    Ca    10.0      08 Feb 2018 11:49  Phos  4.6     02-08  Mg     2.4     02-08    TPro  5.7<L>  /  Alb  2.6<L>  /  TBili  0.4  /  DBili  x   /  AST  13  /  ALT  13  /  AlkPhos  110  02-08    PT/INR - ( 07 Feb 2018 05:30 )   PT: 15.2 sec;   INR: 1.39 ratio         PTT - ( 07 Feb 2018 05:30 )  PTT:> 200 sec  CARDIAC MARKERS ( 08 Feb 2018 02:28 )  x     / <0.01 ng/mL / 24 U/L / x     / 2.2 ng/mL  CARDIAC MARKERS ( 07 Feb 2018 05:30 )  x     / <0.01 ng/mL / 19 U/L / x     / 2.4 ng/mL      ABG - ( 07 Feb 2018 12:12 )  pH: 7.33  /  pCO2: 58    /  pO2: 118   / HCO3: 30    / Base Excess: 3.2   /  SaO2: 99                    PLAN:  CHERYL  1)Creatinine trend is rising from 1.48>1.58>1.73  2) holding diuresis to prevent further LVOT obstruction  3) will repeat K in am after kaxeylate dosage  4) DNR/DNI        TONY Blanc [id number]

## 2018-02-09 NOTE — PROGRESS NOTE ADULT - SUBJECTIVE AND OBJECTIVE BOX
CC: DYSPNEA    HPI: 89F with PMH of HTN, HLD, BPPV, L hip fracture (s/p IM nailing in 1997), AF, meningioma, and fall who was admitted after dyspnea and lethargy at outpatient cardiology appointment, and admitted for acute diastolic heart failure exacerbation. Patient was hospitalized for influenza a month ago, during which she had AMS attributed to robitussin use. Currently being monitored in the CCU for pulmonary edema, but diuretics held to avoid LVOT obstruction, complicated by rapid AF. Has required BiPAP but patient not fully adherent.     INTERVAL EVENTS: esmolol drip stopped, patient continues to largely refuse BiPAP. Family on board for palliative care unit transfer.     ADVANCE DIRECTIVES:    DNR [X ] YES [ ] NO                            [ ] Completed  MOLST  [ ] YES [ ] NO                      [ ] Completed  Health Care Proxy [X ] YES  [ ] NO   [ ] Completed  Living Will  [ ] YES [ ] NO             [ ] Surrogate  [X] HCP  [ ] Guardian:         Daughter: Perri Jean [phone]                                      ALLERGIES:   Allergies    No Known Allergies    Intolerances    MEDICATIONS  (STANDING):  apixaban 2.5 milliGRAM(s) Oral every 12 hours    MEDICATIONS  (PRN):  artificial  tears Solution 1 Drop(s) Right EYE every 6 hours PRN Dry Eyes  HYDROmorphone  Injectable 0.2 milliGRAM(s) IV Push every 1 hour PRN Moderate Pain (4 - 6)        PRESENT SYMPTOMS:  Source: [ X] Patient   [X ] Family   [X ] Team     Pain:                        [X ] No [ ] Yes             [ ] Mild [ ] Moderate [ ] Severe    Onset -  Location -  Duration -  Character -  Alleviating/Aggravating -  Radiation -  Timing -      Dyspnea:                [X ] No [ ] Yes             [ ] Mild [ ] Moderate [ ] Severe    Anxiety:                  [X ] No [ ] Yes             [ ] Mild [ ] Moderate [ ] Severe    Fatigue:                  [X ] No [ ] Yes             [ ] Mild [ ] Moderate [ ] Severe    Nausea:                  [ ] No [ ] Yes             [ ] Mild [ ] Moderate [ ] Severe    Loss of appetite:   [ ] No [ ] Yes             [ ] Mild [ ] Moderate [ ] Severe    Constipation:        [ ] No [ ] Yes             [ ] Mild [ ] Moderate [ ] Severe    Other Symptoms:  [ ] All other review of systems negative   [X ] Unable to obtain due to patient tiredness    Karnofsky Performance Score/Palliative Performance Status Version 2:     50%    PHYSICAL EXAM:  Vital Signs Last 24 Hrs  T(C): 36.3 (09 Feb 2018 09:00), Max: 36.6 (08 Feb 2018 19:00)  T(F): 97.4 (09 Feb 2018 09:00), Max: 97.9 (08 Feb 2018 19:00)  HR: 112 (09 Feb 2018 12:00) (96 - 139)  BP: 106/50 (09 Feb 2018 12:00) (77/47 - 138/115)  BP(mean): 65 (09 Feb 2018 12:00) (55 - 124)  RR: 16 (09 Feb 2018 12:00) (11 - 24)  SpO2: 100% (09 Feb 2018 12:00) (96% - 100%)    Limited exam, patient tired  General:  [ ] Alert  [ ] Oriented x      [X ] Lethargic  [ ] Agitated   [ ] Cachexia   [ ] Unarousable  [ ] Verbal  [ ] Non-Verbal  [ ] Sedated    HEENT:  [ ] Normal   [ ] Dry mouth   [ ] ET Tube    [ ] Trach  [ ] Oral lesions   [] Clear conjunctiva    Lungs:   [ ] Clear [ ] Tachypnea  [ ] Audible excessive secretions   [ ] Rhonchi        [ ] Right [ ] Left [ ] Bilateral  [ ] Crackles        [ ] Right [ ] Left [ ] Bilateral  [ ] Wheezing     [ ] Right [ ] Left [ ] Bilateral    Cardiovascular:  [ ] Regular [ ] Irregular [ ] Tachycardia   [ ] Bradycardia  [ ] Murmur [ ] Other    Abdomen: [ ] Soft  [ ] Distended   [ ] +BS  [X ] Non tender [ ] Tender  [ ]PEG   [ ]OGT/ NGT   [ ] Ostomy   Last BM:       Genitourinary: [ ] Normal [ ] Incontinent   [ ] Oliguria/Anuria   [X ] Gaxiola   [ ] No gaxiola    Musculoskeletal:  [ ] Normal   [ ] Weakness  [ ] Bedbound/Wheelchair bound [ ] Edema    Neurological: [ ] No focal deficits  [ ] Cognitive impairment  [ ] Dysphagia [ ] Dysarthria [ ] Paresis [ ] Other     Skin: [ X] Normal   [ ] Pressure ulcer(s)     [ ] Rash   [ ] Other    LABS: reviewed                          12.6   9.9   )-----------( 185      ( 09 Feb 2018 05:24 )             40.4     02-09    134<L>  |  96  |  67<H>  ----------------------------<  115<H>  5.9<H>   |  26  |  1.85<H>    Ca    10.1      09 Feb 2018 05:24  Phos  5.5     02-09  Mg     2.6     02-09      Protein Calorie Malnutrition: [ ] Mild [ ] Moderate [ ] Severe  Oral Intake: [ ] Unable/mouth care only [ ] Minimal [X ] Moderate [ ] Full Capability  Diet: [ ] NPO [ ] Tube feeds [ ] TPN [X ] Other soft diet    RADIOLOGY & ADDITIONAL STUDIES:  CXR 2/6/18  Unchanged bilateral lower lung patchy opacities.   Unchanged small bilateral pleural effusions with adjacent atelectasis.    REFERRALS:   [ ] Chaplaincy  [ ] Hospice  [ ] Child Life  [ ] Social Work  [ ] Case management [ ] Holistic Therapy

## 2018-02-09 NOTE — PROGRESS NOTE ADULT - ATTENDING COMMENTS
Patient is seen and examined with fellow, NP and the CCU house-staff. I agree with the history, physical and the assessment and plan.  will d/c the esmolol gtt  Insulin/glucose for elevated K  awaitng transfer to palliative care
Patient is seen and examined with fellow, NP and the CCU house-staff. I agree with the history, physical and the assessment and plan.  TTE results noted - hyperdynamic LV function with RV dysfunction   hold off on more diuresis at this time to avoid significant LVOT obstruction  will give a small dose of BB at this time  if becomes hemodynamically unstable, will evaluate for possible need for phenylephrine  wean off of BiPAP  pt is DNR

## 2018-02-09 NOTE — PROGRESS NOTE ADULT - PROBLEM SELECTOR PLAN 1
Pulmonary edema, refusing BiPAP most of the day. Suspect will eventually tire out, as is only on NC. No further diuresis, concern for LVOT obstruction.

## 2018-02-09 NOTE — PROGRESS NOTE ADULT - PROBLEM SELECTOR PLAN 3
Dilaudid started this AM, patient currently appears comfortable. Continue to monitor on NC, patient does not want BiPAP.

## 2018-02-09 NOTE — PROGRESS NOTE ADULT - SUBJECTIVE AND OBJECTIVE BOX
CCU2/PICU NOTE    Admission date: 2/6/18  Chief complaint/ Diagnosis: LOVT  HPI: A 89 y/o F w/ h/o HTN, HLD, BPPV, left hip fracture s/p IM nailing (1997), AFib (on eliquis), h/o meningioma, hx of fall , recent admission for flu d/c to rehab and admitted w/ SOB and AMS     Interval history: -130's afib  on Esmolol gtt  s/p Dig load, Dig level this am 3  Family requests palliative care on board    MEDICATIONS  (STANDING):  apixaban 2.5 milliGRAM(s) Oral every 12 hours  digoxin     Tablet 0.125 milliGRAM(s) Oral daily  esMOLOL  Infusion 25 MICROgram(s)/kG/Min (8.01 mL/Hr) IV Continuous <Continuous>    MEDICATIONS  (PRN):  artificial  tears Solution 1 Drop(s) Right EYE every 6 hours PRN Dry Eyes    Allergy: No Known Allergies    ICU Vital Signs Last 24 Hrs  T(C): 36.6 ( Max: 36.6)  HR: 135 (96 - 135)  BP: 96/65  (77/47 - 138/115)  RR: 21  (11 - 24)  SpO2: 100% (96% - 100%)      I&O's Summary  IN: 232 mL / OUT: 270 mL / NET: -38 mL    PHYSICAL EXAM  Appearance: Lethargic   EYES: EOMI, PERRLA, conjunctiva and sclera clear  NECK: Supple, No JVD  CHEST/LUNG: Clear to auscultation bilaterally; No wheezing  HEART: Normal S1, S2. No murmurs, rubs, or gallops  ABDOMEN: + Bowel sounds, Soft, NT, ND   EXTREMITIES:  2+ Peripheral Pulses, No clubbing, cyanosis, or edema  NEUROLOGY: Lethargic   Lymphatic: No lymphadenopathy  SKIN: No rashes or lesions    Interpretation of Telemetry: afib w/ vent rate 110-130'S                      12.6   9.9   )-----------( 185               40.4         134<L>  |  96  |  67<H>  ----------------------------<  115<H>  5.9<H>   |  26  |  1.85<H>    Ca    10.1     Phos  5.5    Mg     2.6      TPro  6.0  /  Alb  2.9<L>  /  TBili  0.4  /  DBili  x   /  AST  15  /  ALT  13  /  AlkPhos  119  02-09      CARDIAC MARKERS ( 08 Feb 2018 02:28 )  x     / <0.01 ng/mL / 24 U/L / x     / 2.2 ng/mL                      CAPILLARY BLOOD GLUCOSE

## 2018-02-09 NOTE — PROGRESS NOTE ADULT - PROBLEM SELECTOR PLAN 4
Discussed at length with daughter and granddaughter. They understand worsening prognosis, and would like transfer to PCU. They understand goal of PCU is short-term, and ultimately if she is stable enough, patient wants to pass at home. Discussed with CCU and PCU teams. On board for PCU. Discussed at length with daughter and granddaughter. They understand worsening prognosis, and would like transfer to PCU. They understand goal of PCU is short-term, and ultimately if she is stable enough, patient wants to pass at home. No further blood draws. Limit VS. Discussed with CCU and PCU teams. On board for PCU.

## 2018-02-09 NOTE — CHART NOTE - NSCHARTNOTEFT_GEN_A_CORE
CCU2/PICU NOTE    Admission date: 2/6/18  Chief complaint/ Diagnosis: LOVT, SOB  HPI: This is a 89 y/o female with h/o HTN, HLD, BPPV, left hip fracture s/p IM nailing (1997), AFib (on eliquis), h/o meningioma, hx of fall who was brought to cardiologist today for appointment, noted in office to be dyspneic, SOB, with increased lethargy and instructed to report to ED. Pt was brought to ED by daughter who also notes increased LE edema. Of note, pt hospitalized for influenza approx 1 month ago and had BP medications increased. AMS last admission attributed to robitussin use. Pt denies CP, LOC, palpitations, fever, chills, n/v, rash, diarrhea, productive cough. Pt placed on bipap in ED and given 500cc fluid for hypotension. Pt also noted to be bradycardic. Pt admitted to CCU2 for further management/workup.  Daughter is patient's healthcare proxy, pt made DNR/DNI.     Interval history: Deteriorating overnight  +lethargic    MEDICATIONS  (STANDING):  apixaban 2.5 milliGRAM(s) Oral every 12 hours    MEDICATIONS  (PRN):  artificial  tears Solution 1 Drop(s) Right EYE every 6 hours PRN Dry Eyes  HYDROmorphone  Injectable 0.2 milliGRAM(s) IV Push every 1 hour PRN Moderate Pain (4 - 6)      PAST MEDICAL & SURGICAL HISTORY:  BPPV (benign paroxysmal positional vertigo)  Hip fracture, left: s/p orthopedic intervention  Hyperlipidemia  Hypertension  Meningioma: s/p resection 2003  H/O meningioma of the brain  FAMILY HISTORY:  No pertinent family history in first degree relatives    No Known Allergies      ICU Vital Signs Last 24 Hrs  T(C): 36.3 (Max: 36.6)  HR: 118(96 - 139)  BP: 83/39 (77/47 - 138/115)  RR: 13  (11 - 24)  SpO2: 99%  (96% - 100%)      I&O's Summary    08 Feb 2018 07:01  -  09 Feb 2018 07:00  --------------------------------------------------------  IN: 232 mL / OUT: 270 mL / NET: -38 mL    09 Feb 2018 07:01  -  09 Feb 2018 14:31  --------------------------------------------------------  IN: 8 mL / OUT: 20 mL / NET: -12 mL      PHYSICAL EXAM  HEAD:  Atraumatic, Normocephalic  EYES: EOMI, PERRLA, conjunctiva and sclera clear  NECK: Supple, No JVD  CHEST/LUNG: Clear to auscultation bilaterally; No wheezing  HEART: Normal S1, S2. No murmurs, rubs, or gallops  ABDOMEN: + Bowel sounds, Soft, NT, ND   EXTREMITIES:  2+ Peripheral Pulses, No clubbing, cyanosis, or edema  NEURO: Lethargic   SKIN: No rashes or lesions    Interpretation of Telemetry: afib                          12.6   9.9   )-----------( 185      ( 09 Feb 2018 05:24 )             40.4       02-09    134<L>  |  96  |  67<H>  ----------------------------<  115<H>  5.9<H>   |  26  |  1.85<H>    Ca    10.1      09 Feb 2018 05:24  Phos  5.5     02-09  Mg     2.6     02-09  PLAN: Family expressed their wish for the comfort care. Off Esmolol gtt Pt will be transferred to PCU

## 2018-02-09 NOTE — PROGRESS NOTE ADULT - ASSESSMENT
89F with PMH of HTN, HLD, BPPV, L hip fracture (s/p IM nailing in 1997), AF, meningioma, and fall who was admitted after dyspnea and lethargy at outpatient cardiology appointment, and admitted for acute diastolic heart failure exacerbation. Patient was hospitalized for influenza a month ago, during which she had AMS attributed to robitussin use. Currently being monitored in the CCU for pulmonary edema, but diuretics held to avoid LVOT obstruction, complicated by rapid AF. Has required BiPAP but patient not fully adherent.
A 89 y/o F w/ h/o HTN, HLD, BPPV, left hip fracture s/p IM nailing (1997), AFib (on eliquis), h/o meningioma, hx of fall , recent admission for flu p/w SOB and bradycardia.  Pt made DNR/DNI. Pressors are ok with daughter, no invasive measures.
A 89 y/o F w/ h/o HTN, HLD, BPPV, left hip fracture s/p IM nailing (1997), AFib (on eliquis), h/o meningioma, hx of fall , recent admission for flu d/c to rehab and admitted w/ SOB and AMS
A 89 y/o F w/ h/o HTN, HLD, BPPV, left hip fracture s/p IM nailing (1997), AFib (on eliquis), h/o meningioma, hx of fall , recent admission for flu p/w SOB and bradycardia.  Pt made DNR/DNI. Pressors are ok with daughter, no invasive measures.

## 2018-02-10 VITALS — OXYGEN SATURATION: 51 % | HEART RATE: 128 BPM

## 2018-02-10 PROCEDURE — 87798 DETECT AGENT NOS DNA AMP: CPT

## 2018-02-10 PROCEDURE — 87040 BLOOD CULTURE FOR BACTERIA: CPT

## 2018-02-10 PROCEDURE — 85730 THROMBOPLASTIN TIME PARTIAL: CPT

## 2018-02-10 PROCEDURE — 82550 ASSAY OF CK (CPK): CPT

## 2018-02-10 PROCEDURE — 84295 ASSAY OF SERUM SODIUM: CPT

## 2018-02-10 PROCEDURE — 80162 ASSAY OF DIGOXIN TOTAL: CPT

## 2018-02-10 PROCEDURE — 82947 ASSAY GLUCOSE BLOOD QUANT: CPT

## 2018-02-10 PROCEDURE — 94660 CPAP INITIATION&MGMT: CPT

## 2018-02-10 PROCEDURE — 80053 COMPREHEN METABOLIC PANEL: CPT

## 2018-02-10 PROCEDURE — 82553 CREATINE MB FRACTION: CPT

## 2018-02-10 PROCEDURE — 86901 BLOOD TYPING SEROLOGIC RH(D): CPT

## 2018-02-10 PROCEDURE — 87633 RESP VIRUS 12-25 TARGETS: CPT

## 2018-02-10 PROCEDURE — 93306 TTE W/DOPPLER COMPLETE: CPT

## 2018-02-10 PROCEDURE — 87086 URINE CULTURE/COLONY COUNT: CPT

## 2018-02-10 PROCEDURE — 81001 URINALYSIS AUTO W/SCOPE: CPT

## 2018-02-10 PROCEDURE — 85610 PROTHROMBIN TIME: CPT

## 2018-02-10 PROCEDURE — 84100 ASSAY OF PHOSPHORUS: CPT

## 2018-02-10 PROCEDURE — 82803 BLOOD GASES ANY COMBINATION: CPT

## 2018-02-10 PROCEDURE — 85014 HEMATOCRIT: CPT

## 2018-02-10 PROCEDURE — 82435 ASSAY OF BLOOD CHLORIDE: CPT

## 2018-02-10 PROCEDURE — 84484 ASSAY OF TROPONIN QUANT: CPT

## 2018-02-10 PROCEDURE — 99291 CRITICAL CARE FIRST HOUR: CPT | Mod: 25

## 2018-02-10 PROCEDURE — 83735 ASSAY OF MAGNESIUM: CPT

## 2018-02-10 PROCEDURE — 71045 X-RAY EXAM CHEST 1 VIEW: CPT

## 2018-02-10 PROCEDURE — 83605 ASSAY OF LACTIC ACID: CPT

## 2018-02-10 PROCEDURE — 84132 ASSAY OF SERUM POTASSIUM: CPT

## 2018-02-10 PROCEDURE — 86900 BLOOD TYPING SEROLOGIC ABO: CPT

## 2018-02-10 PROCEDURE — 87581 M.PNEUMON DNA AMP PROBE: CPT

## 2018-02-10 PROCEDURE — 85027 COMPLETE CBC AUTOMATED: CPT

## 2018-02-10 PROCEDURE — 86850 RBC ANTIBODY SCREEN: CPT

## 2018-02-10 PROCEDURE — 36600 WITHDRAWAL OF ARTERIAL BLOOD: CPT

## 2018-02-10 PROCEDURE — 83880 ASSAY OF NATRIURETIC PEPTIDE: CPT

## 2018-02-10 PROCEDURE — 82330 ASSAY OF CALCIUM: CPT

## 2018-02-10 PROCEDURE — 93005 ELECTROCARDIOGRAM TRACING: CPT

## 2018-02-10 PROCEDURE — 82962 GLUCOSE BLOOD TEST: CPT

## 2018-02-10 PROCEDURE — 87486 CHLMYD PNEUM DNA AMP PROBE: CPT

## 2018-02-10 RX ADMIN — HYDROMORPHONE HYDROCHLORIDE 0.2 MILLIGRAM(S): 2 INJECTION INTRAMUSCULAR; INTRAVENOUS; SUBCUTANEOUS at 02:10

## 2018-02-10 RX ADMIN — HYDROMORPHONE HYDROCHLORIDE 0.2 MILLIGRAM(S): 2 INJECTION INTRAMUSCULAR; INTRAVENOUS; SUBCUTANEOUS at 01:55

## 2018-02-10 RX ADMIN — Medication 0.5 MILLIGRAM(S): at 03:52

## 2018-02-10 NOTE — PROVIDER CONTACT NOTE (CHANGE IN STATUS NOTIFICATION) - ASSESSMENT
no response to external stimuli  no respirations  no apical heart beat  no pupillary response to light

## 2018-02-10 NOTE — DISCHARGE NOTE FOR THE EXPIRED PATIENT - HOSPITAL COURSE
89F with PMH of HTN, HLD, BPPV, L hip fracture (s/p IM nailing in ), AF, meningioma, and fall who was admitted after she was found to be dyspneic and lethargic at an outpatient cardiology appointment. Pt was admitted for acute diastolic heart failure exacerbation to the CCU in setting of rapid atrial fibrillation and LVOT obstruction. Pt had ongoing respiratory failure due to heart failure managed with BIPAP. Pt and family elected to discontinue BIPAP and pursue comfort care. Pt was transferred to PCU for comfort care and  the following day at 0830.

## 2018-02-20 NOTE — PROVIDER CONTACT NOTE (CHANGE IN STATUS NOTIFICATION) - RECOMMENDATIONS
Fluoxetine already called on in 2/17/2018. Med discontinued. Wait list appointment made  
pt pronounced dead at 0830

## 2018-08-13 NOTE — PROGRESS NOTE ADULT - PROBLEM SELECTOR PLAN 8
Schedule her at 4:40 visit today.    She may come in this morning or earlier this afternoon.
New onset Atrial fibrillation on presentation. now in NSR. CHADsVASC of 4.   Will check Echo to evaluate for valvular disease prior to initiation of AC  Cardizem 30 mg PO q6h  Will reassess need for full AC tomorrow
New onset Atrial fibrillation on presentation. now in NSR. CHADsVASC of 4.   Will check Echo to evaluate for valvular disease prior to initiation of AC  Increased Cardizem to 60 mg PO q6h  Will reassess need for full AC tomorrow
c/w Statin
c/w Statin
eliquis and cardizem 60 mg PO q6h  TTE noted  appreciate cards
eliquis and cardizem 60 mg PO q6h  TTE noted  appreciate cards

## 2019-08-19 NOTE — ED ADULT TRIAGE NOTE - CCCP TRG CHIEF CMPLNT
HPI  Ms. Jaki Puckett is a 80y.o. year old female, she is seen today for caregiver stress. Has been seen multiple times since this appt was made. Has been seen recently for edema in legs - now wearing compression stockings and better. Also taking lasix daily which has helped. Says her stress is better, doesn't have more help with her son. No HA or dizziness. Says May was stressful and she now feels much better. No n/v/abd pain. Occasional \"soreness\" in upper abdominal area. Has been eating more tomatoes - acidic - which may be contributing to abdominal pain. Normal EGD during admission 6/2019. Chief Complaint   Patient presents with    Stress     Room 2A// NON fasting         Prior to Admission medications    Medication Sig Start Date End Date Taking? Authorizing Provider   furosemide (LASIX) 20 mg tablet Take 1 Tab by mouth daily as needed (swelling). 7/1/19  Yes Zarina Robles MD   pantoprazole (PROTONIX) 40 mg tablet Take 1 Tab by mouth daily. 6/18/19  Yes Zarina Robles MD   pravastatin (PRAVACHOL) 20 mg tablet TAKE 1 TABLET EVERY DAY  (  DOSE  LOWERED) 5/21/19  Yes Zarina Robles MD   meclizine (ANTIVERT) 25 mg tablet Take 1 Tab by mouth three (3) times daily as needed for Dizziness. 5/18/19  Yes Lena Dailey MD   Cholecalciferol, Vitamin D3, (VITAMIN D3) 2,000 unit cap capsule Take 2,000 Units by mouth daily. 11/7/17  Yes Zarina Robles MD   ACETAMINOPHEN (TYLENOL PO) Take  by mouth as needed. Yes Provider, Historical   VIT C/VIT E/LUTEIN/MIN/OMEGA-3 (OCUVITE PO) Take  by mouth daily.    Yes Provider, Historical         Allergies   Allergen Reactions    Lipitor [Atorvastatin] Myalgia    Pravastatin Myalgia    Zetia [Ezetimibe] Other (comments)     itching         REVIEW OF SYSTEMS:  Per HPI    PHYSICAL EXAM:  Visit Vitals  /68 (BP 1 Location: Left arm, BP Patient Position: Sitting)   Pulse 84   Temp 98.7 °F (37.1 °C) (Oral)   Resp 14   Ht 5' 2\" (1.575 m) Wt 105 lb (47.6 kg)   SpO2 95%   BMI 19.20 kg/m²     Constitutional: Appears well-developed and well-nourished. No distress. HENT:   Head: Normocephalic and atraumatic. Eyes: No scleral icterus. Cardiovascular: Normal S1/S2, regular rhythm. No murmurs, rubs, or gallops. Pulmonary/Chest: Effort normal and breath sounds normal. No respiratory distress. No wheezes, rhonchi, or rales. Ext: compression stockings in place with trace edema L>R. Neurological: Alert. Psychiatric: Normal mood and affect. Behavior is normal.     Lab Results   Component Value Date/Time    Sodium 141 06/11/2019 03:02 AM    Potassium 3.5 06/11/2019 03:02 AM    Chloride 112 (H) 06/11/2019 03:02 AM    CO2 24 06/11/2019 03:02 AM    Anion gap 5 06/11/2019 03:02 AM    Glucose 89 06/11/2019 03:02 AM    BUN 10 06/11/2019 03:02 AM    Creatinine 0.73 06/11/2019 03:02 AM    BUN/Creatinine ratio 14 06/11/2019 03:02 AM    GFR est AA >60 06/11/2019 03:02 AM    GFR est non-AA >60 06/11/2019 03:02 AM    Calcium 7.4 (L) 06/11/2019 03:02 AM    Bilirubin, total 0.6 06/08/2019 05:52 PM    AST (SGOT) 15 06/08/2019 05:52 PM    Alk. phosphatase 55 06/08/2019 05:52 PM    Protein, total 7.1 06/08/2019 05:52 PM    Albumin 3.6 06/08/2019 05:52 PM    Globulin 3.5 06/08/2019 05:52 PM    A-G Ratio 1.0 (L) 06/08/2019 05:52 PM    ALT (SGPT) 17 06/08/2019 05:52 PM     No results found for: HBA1C, HGBE8, DIA1ESNX, JPL0IENT   Lab Results   Component Value Date/Time    Cholesterol, total 172 05/01/2019 08:43 AM    HDL Cholesterol 78 05/01/2019 08:43 AM    LDL, calculated 81 05/01/2019 08:43 AM    VLDL, calculated 13 05/01/2019 08:43 AM    Triglyceride 64 05/01/2019 08:43 AM    CHOL/HDL Ratio 2.6 10/12/2010 08:57 AM          ASSESSMENT/PLAN  Diagnoses and all orders for this visit:    1. Gastroesophageal reflux disease without esophagitis  Controlled overall with protonix - little worse with tomatoes - may take tums prn and modify diet  2.  Caregiver stress  improved  3. Localized edema  Improved with lasix, compression stockings        There are no preventive care reminders to display for this patient. Follow-up and Dispositions    · Return in about 6 months (around 2/19/2020) for gerd. Reviewed plan of care. Patient has provided input and agrees with goals. The nurse provided the patient and/or family with advanced directive information if needed and encouraged the patient to provide a copy to the office when available. altered mental status

## 2021-09-17 NOTE — ED PROVIDER NOTE - NS_EDPROVIDERDISPOUSERTYPE_ED_A_ED
DISPLAY PLAN FREE TEXT
Attending Attestation (For Attendings USE Only)...

## 2022-03-10 NOTE — PHYSICAL THERAPY INITIAL EVALUATION ADULT - PHYSICAL ASSIST/NONPHYSICAL ASSIST: STAND/SIT, REHAB EVAL
ANTICOAGULATION MANAGEMENT     Ashlie Brantley 89 year old female is on warfarin with supratherapeutic INR result. (Goal INR 2.0-3.0)    Recent labs: (last 7 days)     03/10/22  0000   INR 4.00*       ASSESSMENT       Source(s): Chart Review and Patient/Caregiver Call       Warfarin doses taken: Warfarin taken as instructed    Diet: No new diet changes identified - eats greens 3-4 x week (every other day)    New illness, injury, or hospitalization: Yes: low-grade fever, chills & diarrhea started Monday - sx have subsided now - elevated levels likely related to minor illness     Medication/supplement changes: Tylenol prn    Signs or symptoms of bleeding or clotting: No    Previous INR: Supratherapeutic    Additional findings: Likely patient will need to return to 22.5 mg weekly, but for now dose reduction advised (previous INRs quite stable on 22.5 mg weekly)       PLAN     Recommended plan for temporary change(s) affecting INR     Dosing Instructions: Hold dose then Decrease your warfarin dose (11.1% change) with next INR in 1 week       Summary  As of 3/10/2022    Full warfarin instructions:  3/10: Hold; Otherwise 5 mg every Mon; 2.5 mg all other days   Next INR check:  3/18/2022             Telephone call with daughter Corrina who verbalizes understanding and agrees to plan    Patient to recheck with home meter    Education provided: Please call back if any changes to your diet, medications or how you've been taking warfarin, Importance of consistent vitamin K intake, Impact of vitamin K foods on INR, Monitoring for bleeding signs and symptoms, Monitoring for clotting signs and symptoms and Importance of notifying clinic for changes in medications; a sooner lab recheck maybe needed.    Plan made per [facility] anticoagulation protocol    Maddie Hall, RN  Anticoagulation Clinic  3/10/2022    _______________________________________________________________________     Anticoagulation Episode Summary     Current INR  goal:  2.0-3.0   TTR:  62.1 % (1 y)   Target end date:  Indefinite   Send INR reminders to:  BRIEAG KASDAGOBERTO    Indications    Atrial fibrillation (H) [I48.91] [I48.91]  Long term current use of anticoagulant therapy [Z79.01]  Longstanding persistent atrial fibrillation (H) [I48.11]  Persistent atrial fibrillation (H) [I48.19]  Long term (current) use of opiate analgesic [Z79.891]           Comments:           Anticoagulation Care Providers     Provider Role Specialty Phone number    Anay Miner NP Referring Nurse Practitioner - Family [phone]               verbal cues/nonverbal cues (demo/gestures)/1 person assist

## 2022-04-18 NOTE — ED CLERICAL - DIVISION
[facility]... C A R D I O L O G Y  *********************    DATE OF SERVICE: 04-18-22    HISTORY OF PRESENT ILLNESS: HPI:  Patient is a 89 y/o Female with PMH of HTN, CAD, prior syncope s/p loop recorder, PAF only on ASA, hypothyroidism, AVN of left femoral head, arthritis of knees, and chronic urinary frequency who is admitted s/p syncope. Cardiology consulted for further evaluation. Patient found to have complete AV block on loop recorder interrogation. Now s/p micra PPM. Patient feels well post op with no complaints. Denies chest pain, SOB, palpitations, or dizziness.    PAST MEDICAL & SURGICAL HISTORY:  Urinary Frequency    Bilateral Cataracts    HTN (hypertension)    Spinal stenosis    Hypothyroidism    Non-ST elevation MI (NSTEMI)    Macular degeneration    (HFpEF) heart failure with preserved ejection fraction    Paroxysmal atrial fibrillation    Chronic kidney disease (CKD)    Ectopic pregnancy, tubal    S/P ORIF (open reduction internal fixation) fracture  R hip    S/P breast lumpectomy    S/P cataract surgery  b/l            MEDICATIONS:  MEDICATIONS  (STANDING):  amLODIPine   Tablet 5 milliGRAM(s) Oral daily  aspirin enteric coated 81 milliGRAM(s) Oral daily  atorvastatin 20 milliGRAM(s) Oral at bedtime  carvedilol 3.125 milliGRAM(s) Oral every 12 hours  levothyroxine 100 MICROGram(s) Oral daily  polyethylene glycol 3350 17 Gram(s) Oral two times a day  senna 2 Tablet(s) Oral at bedtime  spironolactone 25 milliGRAM(s) Oral daily      Allergies    sulfa topicals (Unknown)    Intolerances    morphine (Drowsiness; Faint; Hypotension)      FAMILY HISTORY:  Family history of acute myocardial infarction      Non-contributary for premature coronary disease or sudden cardiac death    SOCIAL HISTORY:    [x ] Non-smoker  [ ] Smoker  [ ] Alcohol    FLU VACCINE THIS YEAR STARTS IN AUGUST:  [ ] Yes    [ ] No    IF OVER 65 HAVE YOU EVER HAD A PNA VACCINE:  [ ] Yes    [ ] No       [ ] N/A      REVIEW OF SYSTEMS:  [ ]chest pain  [  ]shortness of breath  [  ]palpitations  [ x ]syncope  [ ]near syncope [ ]upper extremity weakness   [ ] lower extremity weakness  [  ]diplopia  [  ]altered mental status   [  ]fevers  [ ]chills [ ]nausea  [ ]vomiting  [  ]dysphagia    [ ]abdominal pain  [ ]melena  [ ]BRBPR    [  ]epistaxis  [  ]rash    [ ]lower extremity edema        [X] All others negative	  [ ] Unable to obtain      LABS:	 	    CARDIAC MARKERS:                              12.6   6.97  )-----------( 189      ( 18 Apr 2022 06:32 )             38.1     Hb Trend: 12.6<--    04-18    141  |  104  |  35<H>  ----------------------------<  94  4.3   |  22  |  1.19    Ca    9.3      18 Apr 2022 06:32  Mg     2.0     04-18    TPro  6.9  /  Alb  3.7  /  TBili  0.4  /  DBili  x   /  AST  32  /  ALT  74<H>  /  AlkPhos  153<H>  04-18    Creatinine Trend: 1.19<--, 1.04<--, 1.09<--, 1.32<--, 1.08<--, 1.16<--    Coags:  PT/INR - ( 18 Apr 2022 06:32 )   PT: 14.5 sec;   INR: 1.25 ratio         PTT - ( 18 Apr 2022 06:32 )  PTT:32.6 sec    proBNP:   Lipid Profile:   HgA1c:   TSH:         PHYSICAL EXAM:  T(C): 36.6 (04-18-22 @ 12:56), Max: 36.6 (04-18-22 @ 12:56)  HR: 64 (04-18-22 @ 12:56) (57 - 79)  BP: 163/94 (04-18-22 @ 12:56) (105/54 - 186/92)  RR: 18 (04-18-22 @ 12:56) (14 - 19)  SpO2: 96% (04-18-22 @ 12:56) (95% - 98%)  Wt(kg): --   BMI (kg/m2): 18.9 (04-18-22 @ 08:36)  I&O's Summary    17 Apr 2022 07:01  -  18 Apr 2022 07:00  --------------------------------------------------------  IN: 910 mL / OUT: 2050 mL / NET: -1140 mL    18 Apr 2022 07:01  -  18 Apr 2022 15:18  --------------------------------------------------------  IN: 0 mL / OUT: 0 mL / NET: 0 mL        Gen: Appears well in NAD  HEENT:  (-)icterus (-)pallor  CV: N S1 S2 1/6 NORRIS (+)2 Pulses B/l  Resp:  Clear to auscultation B/L, normal effort  GI: (+) BS Soft, NT, ND  Lymph:  (-)Edema, (-)obvious lymphadenopathy  Skin: Warm to touch, Normal turgor  Psych: Appropriate mood and affect      TELEMETRY: SR 60-80s	      ECG: Sinus Arnoldo/V Paced  	    < from: Transthoracic Echocardiogram (04.16.22 @ 09:34) >  Conclusions:  1. Mitral annular calcification and calcified mitral  leaflets with normal diastolic opening. Mild mitral  regurgitation.  2. Calcified trileaflet aortic valve withmidly decreased  opening. Peak transaortic valve gradient equals 8 mm Hg,  mean transaortic valve gradient equals 4 mm Hg, aortic  valve velocity time integral equals 31 cm.  3. Eccentric left ventricular hypertrophy (dilated left  ventricle with normal relative wall thickness).  4. Overall preserved left ventricular ejection fraction.  Apical cap hypokinesis.  5. Severe  diastolic dysfunction.  6. Normal right ventricular size and function.    < end of copied text >      RADIOLOGY:         CXR: < from: Xray Chest 1 View- PORTABLE-Urgent (04.18.22 @ 11:05) >  IMPRESSION:    Micra device projects over the heart. No pneumothorax seen however   superiormost apices excluded from the image and not evaluated.    Left midlung linear atelectasis. Otherwise clear lungs.    --- End of Report ---    < end of copied text >      ASSESSMENT/PLAN: Patient is a 89 y/o Female with PMH of HTN, CAD, prior syncope s/p loop recorder, PAF only on ASA, hypothyroidism, AVN of left femoral head, arthritis of knees, and chronic urinary frequency who is admitted s/p syncope. Cardiology consulted for further evaluation. Patient found to have complete AV block on loop recorder interrogation. Now s/p micra PPM.    - EP eval appreciated  - No evidence of clinical HF or anginal symptoms  - TTE noted with normal LV function, severe diastolic dysfunction, no sig valvular abnormalities  - No further inpatient cardiac w/u planned  - Patient to f/u with her cardiologist Dr. Kam Buckley after d/c    Clemente Corbett PA-C  Pager: [phone]

## 2023-06-24 NOTE — PATIENT PROFILE ADULT. - PRO SERVICES AT DISCH
EDUCATION PROVIDED: Pt/CG verbalizes understanding of all information and demo's back as appropriate   PATIENT RESPONSE TO EVALUATION/TREATMENT: Patient demonstrated a positive outcome post treatment and reported increased comfort and increased confidence with transfers and mobility. Patient reported good understanding of the HEP and reports confidence in ability to complete the program as prescribed by PT.    ASSESSMENT AND CONTINUED NEED FOR THE FOLLOWING SKILLS:  Pt is s/p laminectomy. She has spinal precautions - no bending, lifting, twisting - and TLSO for when up. Her incision is covered with clean, dry, intact bandage. Her primary limitation is pain- her andrew spoke with MD who is calling in stronger pain med. Pt presents with: decreased strength, impaired gait, decreased ability w stair negotiation, decreased transfer status, decreased endurance, decreased balance and decreased safety, increased pain. HHPT is medically necessary in order to improve functional mobility/quality of life, decrease burden of care, reduce risk for re-hospitalization, work towards patient's personal goals of return to PLOF w decrease risk for falls. Goals established for increased independence in the home, safe mobility in the home, improvement in strength and ROM - all designed to reduce fall risk and progress toward independence. Patient will benefit from continued PT intervention to progress toward meeting all established goals     PLAN: Pt will be seen 2W1 3W3 to establish and upgrade HEP, gait training with the least restrictive A DEV on flat and uneven surfaces, bed mobility training, transfer training, stair training. Reinforece general safety precautions. spinal precautions - no bending, lifting, twisting.   TLSO when up      DISCHARGE PLANNING DISCUSSED: Discharge to self and family under MD supervision once all goals have been met or patient has reached max potential.
none

## 2024-10-22 NOTE — PROGRESS NOTE ADULT - PROBLEM SELECTOR PLAN 7
Hypotensive on admission with SBP in 80-90  - Hold Losartan, Lasix, Norvasc and Atenolol
Hypotensive on admission with SBP in 80-90  - Hold Losartan, Norvasc and Atenolol  - lasix 20 mg IV daily
Clothing